# Patient Record
Sex: FEMALE | Race: WHITE | NOT HISPANIC OR LATINO | Employment: OTHER | URBAN - METROPOLITAN AREA
[De-identification: names, ages, dates, MRNs, and addresses within clinical notes are randomized per-mention and may not be internally consistent; named-entity substitution may affect disease eponyms.]

---

## 2018-09-18 ENCOUNTER — HOSPITAL ENCOUNTER (EMERGENCY)
Facility: HOSPITAL | Age: 73
Discharge: HOME | End: 2018-09-18
Attending: INTERNAL MEDICINE
Payer: MEDICARE

## 2018-09-18 ENCOUNTER — APPOINTMENT (EMERGENCY)
Dept: RADIOLOGY | Facility: HOSPITAL | Age: 73
End: 2018-09-18
Payer: MEDICARE

## 2018-09-18 ENCOUNTER — OFFICE VISIT (OUTPATIENT)
Dept: URGENT CARE | Facility: CLINIC | Age: 73
End: 2018-09-18
Payer: MEDICARE

## 2018-09-18 VITALS
RESPIRATION RATE: 16 BRPM | TEMPERATURE: 98.9 F | OXYGEN SATURATION: 97 % | SYSTOLIC BLOOD PRESSURE: 152 MMHG | HEART RATE: 84 BPM | DIASTOLIC BLOOD PRESSURE: 84 MMHG

## 2018-09-18 VITALS
WEIGHT: 160 LBS | SYSTOLIC BLOOD PRESSURE: 173 MMHG | HEART RATE: 71 BPM | HEIGHT: 64 IN | BODY MASS INDEX: 27.31 KG/M2 | TEMPERATURE: 97.6 F | OXYGEN SATURATION: 99 % | RESPIRATION RATE: 16 BRPM | DIASTOLIC BLOOD PRESSURE: 77 MMHG

## 2018-09-18 DIAGNOSIS — I10 ACCELERATED HYPERTENSION: Primary | ICD-10-CM

## 2018-09-18 DIAGNOSIS — R07.9 CHEST PAIN, UNSPECIFIED TYPE: Primary | ICD-10-CM

## 2018-09-18 LAB
ALBUMIN SERPL BCP-MCNC: 4.5 G/DL (ref 3.5–5.7)
ALP SERPL-CCNC: 64 U/L (ref 55–165)
ALT SERPL W P-5'-P-CCNC: 14 U/L (ref 7–52)
ANION GAP SERPL CALCULATED.3IONS-SCNC: 9 MMOL/L (ref 4–13)
APTT PPP: 32 SECONDS (ref 24–36)
AST SERPL W P-5'-P-CCNC: 21 U/L (ref 13–39)
BASOPHILS # BLD AUTO: 0 THOUSANDS/ΜL (ref 0–0.1)
BASOPHILS NFR BLD AUTO: 1 % (ref 0–2)
BILIRUB SERPL-MCNC: 0.5 MG/DL (ref 0.2–1)
BUN SERPL-MCNC: 20 MG/DL (ref 7–25)
CALCIUM SERPL-MCNC: 9.7 MG/DL (ref 8.6–10.5)
CHLORIDE SERPL-SCNC: 98 MMOL/L (ref 98–107)
CO2 SERPL-SCNC: 30 MMOL/L (ref 21–31)
CREAT SERPL-MCNC: 1.1 MG/DL (ref 0.6–1.2)
EOSINOPHIL # BLD AUTO: 0.2 THOUSAND/ΜL (ref 0–0.61)
EOSINOPHIL NFR BLD AUTO: 3 % (ref 0–5)
ERYTHROCYTE [DISTWIDTH] IN BLOOD BY AUTOMATED COUNT: 13.3 % (ref 11.5–14.5)
GFR SERPL CREATININE-BSD FRML MDRD: 50 ML/MIN/1.73SQ M
GLUCOSE SERPL-MCNC: 98 MG/DL (ref 65–99)
HCT VFR BLD AUTO: 38.4 % (ref 34.8–46.1)
HGB BLD-MCNC: 13.6 G/DL (ref 12–16)
HOLD SPECIMEN: NORMAL
INR PPP: 1.19 (ref 0.9–1.5)
LYMPHOCYTES # BLD AUTO: 1.9 THOUSANDS/ΜL (ref 0.6–4.47)
LYMPHOCYTES NFR BLD AUTO: 31 % (ref 21–51)
MCH RBC QN AUTO: 30.5 PG (ref 26–34)
MCHC RBC AUTO-ENTMCNC: 35.3 G/DL (ref 31–37)
MCV RBC AUTO: 86 FL (ref 81–99)
MONOCYTES # BLD AUTO: 0.5 THOUSAND/ΜL (ref 0.17–1.22)
MONOCYTES NFR BLD AUTO: 8 % (ref 2–12)
NEUTROPHILS # BLD AUTO: 3.5 THOUSANDS/ΜL (ref 1.4–6.5)
NEUTS SEG NFR BLD AUTO: 57 % (ref 42–75)
NRBC BLD AUTO-RTO: 0 /100 WBCS
PLATELET # BLD AUTO: 254 THOUSANDS/UL (ref 149–390)
PMV BLD AUTO: 8.1 FL (ref 8.6–11.7)
POTASSIUM SERPL-SCNC: 3.8 MMOL/L (ref 3.5–5.5)
PROT SERPL-MCNC: 7.4 G/DL (ref 6.4–8.9)
PROTHROMBIN TIME: 13.9 SECONDS (ref 10.1–12.9)
RBC # BLD AUTO: 4.44 MILLION/UL (ref 3.9–5.2)
SODIUM SERPL-SCNC: 137 MMOL/L (ref 134–143)
TROPONIN I SERPL-MCNC: <0.03 NG/ML
WBC # BLD AUTO: 6.1 THOUSAND/UL (ref 4.8–10.8)

## 2018-09-18 PROCEDURE — 99285 EMERGENCY DEPT VISIT HI MDM: CPT

## 2018-09-18 PROCEDURE — 99203 OFFICE O/P NEW LOW 30 MIN: CPT | Performed by: PHYSICIAN ASSISTANT

## 2018-09-18 PROCEDURE — 93005 ELECTROCARDIOGRAM TRACING: CPT | Performed by: PHYSICIAN ASSISTANT

## 2018-09-18 PROCEDURE — 85610 PROTHROMBIN TIME: CPT | Performed by: INTERNAL MEDICINE

## 2018-09-18 PROCEDURE — 85025 COMPLETE CBC W/AUTO DIFF WBC: CPT | Performed by: INTERNAL MEDICINE

## 2018-09-18 PROCEDURE — 36415 COLL VENOUS BLD VENIPUNCTURE: CPT | Performed by: INTERNAL MEDICINE

## 2018-09-18 PROCEDURE — 80053 COMPREHEN METABOLIC PANEL: CPT | Performed by: INTERNAL MEDICINE

## 2018-09-18 PROCEDURE — G0463 HOSPITAL OUTPT CLINIC VISIT: HCPCS | Performed by: PHYSICIAN ASSISTANT

## 2018-09-18 PROCEDURE — 85730 THROMBOPLASTIN TIME PARTIAL: CPT | Performed by: INTERNAL MEDICINE

## 2018-09-18 PROCEDURE — 84484 ASSAY OF TROPONIN QUANT: CPT | Performed by: INTERNAL MEDICINE

## 2018-09-18 PROCEDURE — 71046 X-RAY EXAM CHEST 2 VIEWS: CPT

## 2018-09-18 RX ORDER — NITROGLYCERIN 0.4 MG/1
0.4 TABLET SUBLINGUAL ONCE
Status: COMPLETED | OUTPATIENT
Start: 2018-09-18 | End: 2018-09-18

## 2018-09-18 RX ORDER — LISINOPRIL 10 MG/1
10 TABLET ORAL ONCE
Status: COMPLETED | OUTPATIENT
Start: 2018-09-18 | End: 2018-09-18

## 2018-09-18 RX ORDER — LISINOPRIL 10 MG/1
10 TABLET ORAL DAILY
Qty: 20 TABLET | Refills: 0 | Status: SHIPPED | OUTPATIENT
Start: 2018-09-18 | End: 2019-08-02 | Stop reason: SDUPTHER

## 2018-09-18 RX ORDER — HYDROCHLOROTHIAZIDE 25 MG/1
25 TABLET ORAL 2 TIMES DAILY
COMMUNITY
End: 2021-02-02 | Stop reason: HOSPADM

## 2018-09-18 RX ORDER — LISINOPRIL 5 MG/1
2 TABLET ORAL 2 TIMES DAILY
COMMUNITY
End: 2018-09-18

## 2018-09-18 RX ORDER — LISINOPRIL 10 MG/1
10 TABLET ORAL DAILY
Qty: 20 TABLET | Refills: 0 | Status: ON HOLD | OUTPATIENT
Start: 2018-09-18 | End: 2021-02-02 | Stop reason: SDUPTHER

## 2018-09-18 RX ORDER — GLIPIZIDE 5 MG/1
2.5 TABLET ORAL DAILY
COMMUNITY
End: 2021-02-02 | Stop reason: HOSPADM

## 2018-09-18 RX ORDER — DIPHENOXYLATE HYDROCHLORIDE AND ATROPINE SULFATE 2.5; .025 MG/1; MG/1
1 TABLET ORAL DAILY
COMMUNITY

## 2018-09-18 RX ORDER — ASPIRIN 81 MG/1
81 TABLET, CHEWABLE ORAL DAILY
COMMUNITY

## 2018-09-18 RX ADMIN — LISINOPRIL 10 MG: 10 TABLET ORAL at 21:33

## 2018-09-18 RX ADMIN — NITROGLYCERIN 0.4 MG: 0.4 TABLET SUBLINGUAL at 20:28

## 2018-09-18 NOTE — ED PROVIDER NOTES
History  Chief Complaint   Patient presents with    Chest Pain     This is a 23-year-old white female has been suffering from chest discomfort and pressure for the last 3 or 4 days  She describes pain in her legs as well from time to time  This is been keeping her awake  She notes increasing pain in her legs when she walks, however the chest pressure really does not change  She has had no radiation to the jaw no diaphoresis and nothing down left arm  Her bowels and bladder have been normal  She is hypertensive and diabetic  She has no history of cardiac disease  Prior to Admission Medications   Prescriptions Last Dose Informant Patient Reported? Taking?   aspirin 81 mg chewable tablet   Yes Yes   Sig: Chew 81 mg daily   glipiZIDE (GLUCOTROL) 5 mg tablet   Yes No   Sig: Take 2 5 mg by mouth daily     hydrochlorothiazide (HYDRODIURIL) 25 mg tablet   Yes No   Sig: Take 25 mg by mouth 2 (two) times a day     lisinopril (ZESTRIL) 5 mg tablet   Yes No   Sig: Take 2 mg by mouth 2 (two) times a day     multivitamin (THERAGRAN) TABS   Yes Yes   Sig: Take 1 tablet by mouth daily      Facility-Administered Medications: None       Past Medical History:   Diagnosis Date    Diabetes mellitus ([facility] [address] )     Hypertension        Past Surgical History:   Procedure Laterality Date    HYSTERECTOMY         History reviewed  No pertinent family history  I have reviewed and agree with the history as documented  Social History   Substance Use Topics    Smoking status: Never Smoker    Smokeless tobacco: Never Used    Alcohol use No        Review of Systems   Constitutional: Negative for chills and fever  HENT: Negative for rhinorrhea and sore throat  Eyes: Negative for visual disturbance  Respiratory: Positive for shortness of breath  Negative for cough  Cardiovascular: Positive for chest pain  Negative for leg swelling  Gastrointestinal: Negative for abdominal pain, diarrhea, nausea and vomiting  Genitourinary: Negative for dysuria  Musculoskeletal: Positive for myalgias  Negative for back pain  Skin: Negative for rash  Neurological: Negative for dizziness and headaches  Psychiatric/Behavioral: Negative for confusion  All other systems reviewed and are negative  Physical Exam  Physical Exam   Constitutional: She is oriented to person, place, and time  She appears well-developed and well-nourished  HENT:   Nose: Nose normal    Mouth/Throat: Oropharynx is clear and moist  No oropharyngeal exudate  Eyes: Conjunctivae and EOM are normal  Pupils are equal, round, and reactive to light  No scleral icterus  Neck: Normal range of motion  Neck supple  No JVD present  No tracheal deviation present  Cardiovascular: Normal rate and regular rhythm  Exam reveals gallop  No murmur heard  Pulmonary/Chest: Effort normal and breath sounds normal  No respiratory distress  She has no wheezes  She has no rales  Abdominal: Soft  Bowel sounds are normal  There is no tenderness  There is no guarding  Musculoskeletal: Normal range of motion  She exhibits no edema or tenderness  Neurological: She is alert and oriented to person, place, and time  No cranial nerve deficit or sensory deficit  She exhibits normal muscle tone  5/5 motor, nl sens   Skin: Skin is warm and dry  Psychiatric: She has a normal mood and affect  Her behavior is normal    Nursing note and vitals reviewed        Vital Signs  ED Triage Vitals [09/18/18 1903]   Temperature Pulse Respirations Blood Pressure SpO2   97 6 °F (36 4 °C) 82 16 (!) 212/96 97 %      Temp src Heart Rate Source Patient Position - Orthostatic VS BP Location FiO2 (%)   -- -- -- -- --      Pain Score       8           Vitals:    09/18/18 1903   BP: (!) 212/96   Pulse: 82       Visual Acuity      ED Medications  Medications - No data to display    Diagnostic Studies  Results Reviewed     Procedure Component Value Units Date/Time    Comprehensive metabolic panel [[id number]]     Lab Status:  No result Specimen:  Blood     CBC and differential [[id number]]     Lab Status:  No result Specimen:  Blood     Troponin I [[id number]]     Lab Status:  No result Specimen:  Blood     APTT [[id number]]     Lab Status:  No result Specimen:  Blood     Protime-INR [[id number]]     Lab Status:  No result Specimen:  Blood                  X-ray chest 2 views    (Results Pending)              Procedures  ECG 12 Lead Documentation  Date/Time: 9/18/2018 9:17 PM  Performed by: Gisella Solares  Authorized by: Gisella Solares     Indications / Diagnosis:  Chest pressure  ECG reviewed by me, the ED Provider: yes    Patient location:  ED  Previous ECG:     Previous ECG:  Unavailable    Comparison to cardiac monitor: Yes    Interpretation:     Interpretation: normal    Rate:     ECG rate:  78    ECG rate assessment: normal    Rhythm:     Rhythm: sinus rhythm    QRS:     QRS axis:  Normal    QRS intervals:  Normal  Conduction:     Conduction: normal    ST segments:     ST segments:  Normal  T waves:     T waves: normal             Phone Contacts  ED Phone Contact    ED Course  ED Course as of Sep 19 0108   Tue Sep 18, 2018   2118 Symptoms improved after 1 nitroglycerin and decrease in her blood pressure  Will have her follow up with her family doctor monitor outpatient blood pressures  MDM  CritCare Time    Disposition  Final diagnoses:   None     ED Disposition     None      Follow-up Information    None         Patient's Medications   Discharge Prescriptions    No medications on file     No discharge procedures on file      ED Provider  Electronically Signed by           Dwight Castaneda DO  09/18/18 2118       Dwight Castaneda DO  09/19/18 6337

## 2018-09-18 NOTE — PROGRESS NOTES
[facility]    NAME: Jocelin Phan is a 67 y o  female  : 1945    MRN: [id number]  DATE: 2018  TIME: 6:46 PM    Assessment and Plan   Chest pain, unspecified type [R07 9]  1  Chest pain, unspecified type         Patient Instructions     Patient Instructions   Recommend the patient go to the emergency room for further evaluation and workup  She agrees and will be going to [facility]   Patient's daughter is here with her and will drive her  Chief Complaint     Chief Complaint   Patient presents with    Generalized Body Aches     Pt c/o body aches and chest tightness for a week  History of Present Illness   65-year-old diabetic female here with complaint of chest heaviness that radiates to her right shoulder area, body aches and leg cramps for the last couple weeks  Symptoms worsened 2 days ago  Patient feels very weak and just does not feel well  States that she is hurting  Denies shortness of breath  Denies any cold symptoms  No fever or chills  Has felt nauseous today  No vomiting  No abdominal pain, diarrhea or urinary complaints  Review of Systems   Review of Systems   Constitutional: Negative for activity change, appetite change, chills, diaphoresis, fatigue, fever and unexpected weight change  HENT: Negative for congestion, dental problem, hearing loss, sinus pressure, sneezing, sore throat, tinnitus, trouble swallowing and voice change  Eyes: Negative for photophobia, redness and visual disturbance  Respiratory: Positive for chest tightness  Negative for apnea, cough, shortness of breath, wheezing and stridor  Cardiovascular: Positive for chest pain  Negative for palpitations and leg swelling  Gastrointestinal: Positive for nausea  Negative for abdominal distention, abdominal pain, blood in stool, constipation, diarrhea and vomiting  Endocrine: Negative for cold intolerance, heat intolerance, polydipsia, polyphagia and polyuria  Genitourinary: Negative for difficulty urinating, dysuria, flank pain, frequency, hematuria and urgency  Musculoskeletal: Positive for myalgias  Negative for arthralgias, back pain, gait problem, joint swelling, neck pain and neck stiffness  Skin: Negative for pallor, rash and wound  Neurological: Negative for dizziness, tremors, seizures, speech difficulty, weakness and headaches  Hematological: Negative for adenopathy  Does not bruise/bleed easily  Psychiatric/Behavioral: Negative for agitation, confusion, dysphoric mood and sleep disturbance  The patient is not nervous/anxious  All other systems reviewed and are negative  Current Medications     Current Outpatient Prescriptions:     glipiZIDE (GLUCOTROL) 5 mg tablet, Take 5 mg by mouth 2 (two) times a day before meals, Disp: , Rfl:     hydrochlorothiazide (HYDRODIURIL) 25 mg tablet, Take 25 mg by mouth daily, Disp: , Rfl:     lisinopril (ZESTRIL) 5 mg tablet, Take 5 mg by mouth daily, Disp: , Rfl:     Current Allergies     Allergies as of 09/18/2018    (No Known Allergies)          The following portions of the patient's history were reviewed and updated as appropriate: allergies, current medications, past family history, past medical history, past social history, past surgical history and problem list    No past medical history on file  No past surgical history on file  No family history on file  Social History     Social History    Marital status:      Spouse name: N/A    Number of children: N/A    Years of education: N/A     Occupational History    Not on file  Social History Main Topics    Smoking status: Not on file    Smokeless tobacco: Not on file    Alcohol use Not on file    Drug use: Unknown    Sexual activity: Not on file     Other Topics Concern    Not on file     Social History Narrative    No narrative on file     Medications have been verified      Objective   /84   Pulse 84   Temp 98 9 °F (37 2 °C)   Resp 16   SpO2 97%      Physical Exam   Physical Exam   Constitutional: She appears well-developed and well-nourished  No distress  HENT:   Head: Normocephalic  Right Ear: External ear normal    Left Ear: External ear normal    Nose: Nose normal    Mouth/Throat: Oropharynx is clear and moist  No oropharyngeal exudate  Neck: Normal range of motion  Neck supple  Cardiovascular: Normal rate, regular rhythm, normal heart sounds, intact distal pulses and normal pulses  No murmur heard  Pulmonary/Chest: Effort normal and breath sounds normal  No respiratory distress  She has no wheezes  She has no rales  Abdominal: Soft  Bowel sounds are normal  There is no tenderness  Musculoskeletal: Normal range of motion  Lymphadenopathy:     She has no cervical adenopathy  Skin: Skin is warm  No rash noted  Nursing note reviewed

## 2018-09-18 NOTE — PATIENT INSTRUCTIONS
Recommend the patient go to the emergency room for further evaluation and workup  She agrees and will be going to [facility]   Patient's daughter is here with her and will drive her

## 2018-09-19 LAB
ATRIAL RATE: 78 BPM
P AXIS: 51 DEGREES
PR INTERVAL: 144 MS
QRS AXIS: 32 DEGREES
QRSD INTERVAL: 92 MS
QT INTERVAL: 384 MS
QTC INTERVAL: 437 MS
T WAVE AXIS: 69 DEGREES
VENTRICULAR RATE: 78 BPM

## 2018-09-19 PROCEDURE — 93010 ELECTROCARDIOGRAM REPORT: CPT | Performed by: INTERNAL MEDICINE

## 2018-09-19 NOTE — DISCHARGE INSTRUCTIONS
Chronic Hypertension, Ambulatory Care   GENERAL INFORMATION:   Chronic hypertension  is a long-term condition in which your blood pressure (BP) is higher than normal  Your BP is the force of your blood moving against the walls of your arteries  Hypertension is a BP of 140/90 or higher  Common symptoms include the following:   · Headache     · Blurred vision    · Chest pain     · Dizziness or weakness     · Trouble breathing     · Nosebleeds  Seek immediate care for the following symptoms:   · Severe headache or vision loss    · Weakness in an arm or leg    · Confusion or difficulty speaking    · Discomfort in your chest that feels like squeezing, pressure, fullness, or pain    · Suddenly feeling lightheaded or trouble breathing    · Pain or discomfort in your back, neck, jaw, stomach, or arm  Treatment for chronic hypertension  may include medicine to lower your BP  You may also need to make lifestyle changes  Take your medicine exactly as directed  Manage chronic hypertension:   · Take your BP at home  Sit and rest for 5 minutes before you take your BP  Extend your arm and support it on a flat surface  Your arm should be at the same level as your heart  Follow the directions that came with your BP monitor  If possible, take at least 2 BP readings each time  Take your BP at least twice a day at the same times each day, such as morning and evening  Keep a log of your BP readings and bring it to your follow-up visits  · Eat less sodium (salt)  Do not add sodium to your food  Limit foods that are high in sodium, such as canned foods, potato chips, and cold cuts  Your healthcare provider may suggest that you follow the [address] [place] [address] Street  The plan is low in sodium, unhealthy fats, and total fat  It is high in potassium, calcium, and fiber  · Exercise regularly  Exercise at least 30 minutes per day, on most days of the week  This will help decrease your BP   Ask your healthcare provider about the best exercise plan for you  · Limit alcohol  Women should limit alcohol to 1 drink a day  Men should limit alcohol to 2 drinks a day  A drink of alcohol is 12 ounces of beer, 5 ounces of wine, or 1½ ounces of liquor  · Do not smoke  If you smoke, it is never too late to quit  Smoking can increase your BP  Smoking also worsens other health conditions you may have that can increase your risk for hypertension  Ask your healthcare provider for information if you need help quitting  Follow up with your healthcare provider as directed: You will need to return to have your BP checked and to have other lab tests done  Write down your questions so you remember to ask them during your visits  CARE AGREEMENT:   You have the right to help plan your care  Learn about your health condition and how it may be treated  Discuss treatment options with your caregivers to decide what care you want to receive  You always have the right to refuse treatment  The above information is an  only  It is not intended as medical advice for individual conditions or treatments  Talk to your doctor, nurse or pharmacist before following any medical regimen to see if it is safe and effective for you  © 2014 [address] is for End User's use only and may not be sold, redistributed or otherwise used for commercial purposes  All illustrations and images included in CareNotes® are the copyrighted property of A D A M [employer] Inc  or David Pérez

## 2018-10-01 ENCOUNTER — TRANSCRIBE ORDERS (OUTPATIENT)
Dept: ADMINISTRATIVE | Facility: HOSPITAL | Age: 73
End: 2018-10-01

## 2018-10-01 DIAGNOSIS — R07.9 CHEST PAIN, UNSPECIFIED TYPE: Primary | ICD-10-CM

## 2018-11-05 ENCOUNTER — HOSPITAL ENCOUNTER (OUTPATIENT)
Dept: NON INVASIVE DIAGNOSTICS | Facility: CLINIC | Age: 73
Discharge: HOME | End: 2018-11-05
Payer: MEDICARE

## 2018-11-05 DIAGNOSIS — R07.9 CHEST PAIN, UNSPECIFIED TYPE: ICD-10-CM

## 2018-11-05 LAB
CHEST PAIN STATEMENT: NORMAL
MAX DIASTOLIC BP: 80 MMHG
MAX HEART RATE: 187 BPM
MAX PREDICTED HEART RATE: 147 BPM
MAX. SYSTOLIC BP: 200 MMHG
PROTOCOL NAME: NORMAL
REASON FOR TERMINATION: NORMAL
TARGET HR FORMULA: NORMAL
TEST INDICATION: NORMAL
TIME IN EXERCISE PHASE: NORMAL

## 2018-11-05 PROCEDURE — 93016 CV STRESS TEST SUPVJ ONLY: CPT | Performed by: INTERNAL MEDICINE

## 2018-11-05 PROCEDURE — 93018 CV STRESS TEST I&R ONLY: CPT | Performed by: INTERNAL MEDICINE

## 2018-11-05 PROCEDURE — 93017 CV STRESS TEST TRACING ONLY: CPT

## 2019-07-02 ENCOUNTER — HOSPITAL ENCOUNTER (OUTPATIENT)
Dept: RADIOLOGY | Facility: HOSPITAL | Age: 74
Discharge: HOME | End: 2019-07-02
Payer: MEDICARE

## 2019-07-02 ENCOUNTER — TRANSCRIBE ORDERS (OUTPATIENT)
Dept: ADMINISTRATIVE | Facility: HOSPITAL | Age: 74
End: 2019-07-02

## 2019-07-02 DIAGNOSIS — M54.2 CERVICALGIA: ICD-10-CM

## 2019-07-02 DIAGNOSIS — M54.5 LOW BACK PAIN, UNSPECIFIED BACK PAIN LATERALITY, UNSPECIFIED CHRONICITY, WITH SCIATICA PRESENCE UNSPECIFIED: Primary | ICD-10-CM

## 2019-07-02 DIAGNOSIS — M54.5 LOW BACK PAIN, UNSPECIFIED BACK PAIN LATERALITY, UNSPECIFIED CHRONICITY, WITH SCIATICA PRESENCE UNSPECIFIED: ICD-10-CM

## 2019-07-02 PROCEDURE — 72100 X-RAY EXAM L-S SPINE 2/3 VWS: CPT

## 2019-07-02 PROCEDURE — 72040 X-RAY EXAM NECK SPINE 2-3 VW: CPT

## 2019-07-08 ENCOUNTER — TRANSCRIBE ORDERS (OUTPATIENT)
Dept: ADMINISTRATIVE | Facility: HOSPITAL | Age: 74
End: 2019-07-08

## 2019-07-08 DIAGNOSIS — M54.5 LOW BACK PAIN, UNSPECIFIED BACK PAIN LATERALITY, UNSPECIFIED CHRONICITY, WITH SCIATICA PRESENCE UNSPECIFIED: Primary | ICD-10-CM

## 2019-07-15 ENCOUNTER — HOSPITAL ENCOUNTER (OUTPATIENT)
Dept: MRI IMAGING | Facility: HOSPITAL | Age: 74
Discharge: HOME | End: 2019-07-15
Payer: MEDICARE

## 2019-07-15 DIAGNOSIS — M54.5 LOW BACK PAIN, UNSPECIFIED BACK PAIN LATERALITY, UNSPECIFIED CHRONICITY, WITH SCIATICA PRESENCE UNSPECIFIED: ICD-10-CM

## 2019-07-15 PROCEDURE — 72148 MRI LUMBAR SPINE W/O DYE: CPT

## 2019-08-02 ENCOUNTER — OFFICE VISIT (OUTPATIENT)
Dept: URGENT CARE | Facility: CLINIC | Age: 74
End: 2019-08-02
Payer: MEDICARE

## 2019-08-02 VITALS
HEART RATE: 71 BPM | RESPIRATION RATE: 16 BRPM | WEIGHT: 160 LBS | OXYGEN SATURATION: 97 % | BODY MASS INDEX: 27.31 KG/M2 | HEIGHT: 64 IN | DIASTOLIC BLOOD PRESSURE: 82 MMHG | SYSTOLIC BLOOD PRESSURE: 176 MMHG | TEMPERATURE: 97.7 F

## 2019-08-02 DIAGNOSIS — B37.2 YEAST DERMATITIS: Primary | ICD-10-CM

## 2019-08-02 DIAGNOSIS — L25.9 CONTACT DERMATITIS, UNSPECIFIED CONTACT DERMATITIS TYPE, UNSPECIFIED TRIGGER: ICD-10-CM

## 2019-08-02 PROCEDURE — G0463 HOSPITAL OUTPT CLINIC VISIT: HCPCS | Performed by: PHYSICIAN ASSISTANT

## 2019-08-02 PROCEDURE — 99213 OFFICE O/P EST LOW 20 MIN: CPT | Performed by: PHYSICIAN ASSISTANT

## 2019-08-02 RX ORDER — CLOTRIMAZOLE AND BETAMETHASONE DIPROPIONATE 10; .64 MG/G; MG/G
CREAM TOPICAL 2 TIMES DAILY
Qty: 45 G | Refills: 1 | Status: SHIPPED | OUTPATIENT
Start: 2019-08-02 | End: 2021-02-02 | Stop reason: HOSPADM

## 2019-08-02 RX ORDER — MELOXICAM 15 MG/1
TABLET ORAL
COMMUNITY
End: 2021-02-02 | Stop reason: HOSPADM

## 2019-08-02 RX ORDER — GABAPENTIN 100 MG/1
CAPSULE ORAL
COMMUNITY

## 2019-08-02 NOTE — PROGRESS NOTES
[facility]        NAME: Brayden Bruno is a 75 y o  female  : 1945    MRN: [id number]  DATE: 2019  TIME: 1:35 PM    Assessment and Plan   Yeast dermatitis [B37 2]  1  Yeast dermatitis  clotrimazole-betamethasone (LOTRISONE) 1-0 05 % cream   2  Contact dermatitis, unspecified contact dermatitis type, unspecified trigger  fluocinonide (LIDEX) 0 05 % cream         Patient Instructions       Follow up with PCP in 3-5 days  Proceed to  ER if symptoms worsen  Chief Complaint     Chief Complaint   Patient presents with    Rash     Pt c/o a rash for four weeks  History of Present Illness       Patient presents with a pruritic rash for the past 3 weeks  She was treated for 1 week with the tapering steroids which improved this symptoms but now they have returned  She now has a different type of rash beneath both breasts  She states the rashes are very pruritic  She denies any drainage  Review of Systems   Review of Systems   Constitutional: Negative for fever  Respiratory: Negative for cough  Gastrointestinal: Negative for nausea and vomiting  Musculoskeletal: Negative for myalgias  Skin: Positive for rash  Neurological: Negative for weakness and numbness  Hematological: Negative for adenopathy           Current Medications       Current Outpatient Medications:     aspirin 81 mg chewable tablet, Chew 81 mg daily, Disp: , Rfl:     clotrimazole-betamethasone (LOTRISONE) 1-0 05 % cream, Apply topically 2 (two) times a day Apply beneath breasts, Disp: 45 g, Rfl: 1    fluocinonide (LIDEX) 0 05 % cream, Apply topically 2 (two) times a day To treat poison, Disp: 45 g, Rfl: 1    gabapentin (NEURONTIN) 100 mg capsule, gabapentin 100 mg capsule, Disp: , Rfl:     glipiZIDE (GLUCOTROL) 5 mg tablet, Take 2 5 mg by mouth daily  , Disp: , Rfl:     hydrochlorothiazide (HYDRODIURIL) 25 mg tablet, Take 25 mg by mouth 2 (two) times a day  , Disp: , Rfl:     lisinopril (ZESTRIL) 10 mg tablet, Take 1 tablet (10 mg total) by mouth daily, Disp: 20 tablet, Rfl: 0    meloxicam (MOBIC) 15 mg tablet, take 1 tablet by mouth once daily with meals, Disp: , Rfl:     multivitamin (THERAGRAN) TABS, Take 1 tablet by mouth daily, Disp: , Rfl:     Current Allergies     Allergies as of 08/02/2019    (No Known Allergies)            The following portions of the patient's history were reviewed and updated as appropriate: allergies, current medications, past family history, past medical history, past social history, past surgical history and problem list      Past Medical History:   Diagnosis Date    Arthritis     Diabetes mellitus ([facility] [address] )     Hypertension        Past Surgical History:   Procedure Laterality Date    HYSTERECTOMY         History reviewed  No pertinent family history  Medications have been verified  Objective   BP (!) 176/82   Pulse 71   Temp 97 7 °F (36 5 °C)   Resp 16   Ht 5' 4" (1 626 m)   Wt 72 6 kg (160 lb)   SpO2 97%   BMI 27 46 kg/m²        Physical Exam     Physical Exam   Constitutional: She is oriented to person, place, and time  She appears well-developed and well-nourished  HENT:   Head: Normocephalic and atraumatic  Neck: Normal range of motion  Cardiovascular: Normal rate and regular rhythm  Pulmonary/Chest: Effort normal    Neurological: She is alert and oriented to person, place, and time  Skin: Skin is warm and dry  Rash (Maculopapular skin rash on patient's right middle finger right forearm and an erythematous rash beneath both breasts occasional satellite lesion consistent with a yeast dermatitis ) noted  Psychiatric: She has a normal mood and affect  Her behavior is normal    Nursing note and vitals reviewed

## 2019-08-02 NOTE — PATIENT INSTRUCTIONS
Skin Yeast Infection   AMBULATORY CARE:   What do I need to know about a skin yeast infection? Yeast is normally present on the skin  Infection happens when you have too much yeast, or when it gets into a cut on your skin  Certain types of mold and fungus can cause a yeast infection  A skin yeast infection can appear anywhere on your skin or nail beds  Skin yeast infections are usually found on warm, moist parts of the body  Examples include between skin folds or under the breasts  Common symptoms include the following:  Signs and symptoms will depend on the type of yeast causing the infection, and where the infection is located  · Red, scaly skin    · Changes in skin color, especially a beefy red color    · Itching, dry skin    · Painful, cracking skin at the corners of your mouth    · Thick, discolored, chipping nails    · Skin lesions that may be red or purple and round    · Pus bumps  Seek care immediately if:   · You have signs of infection, such as pus, warmth or red streaks coming from the wound, or a fever  Contact your healthcare provider if:   · Your symptoms worsen or do not get better within 7 to 10 days  · You have new or returning signs of a skin yeast infection after treatment  · You have questions or concerns about your condition or care  Treatment for a skin yeast infection  may include antifungal medicine to treat the fungal infection  The medicine be given as a cream, ointment, or pill  Care for the skin near the infection:  You may only have discolored patches of skin, or areas that are dry and flaking  Care for these skin problems as directed by your healthcare provider  If you have painful skin or an open sore, you will need to protect the skin and prevent damage  You will also need to keep the skin dry as much as possible  Ask your healthcare provider how to care for your skin while the infection clears   The following are general guidelines for caring for painful or open skin:  · Keep the skin clean  Ask your healthcare provider if you should wash with mild soap and water  Do not use soap that contains alcohol  Alcohol can dry and irritate the skin and make symptoms worse  Your baby's healthcare provider may tell you to use diaper cream or ointment when you change his diaper  This will protect the skin and prevent moisture from collecting  · Keep the skin dry  Pat the area dry with a towel  Do not rub, because this may irritate the skin  If you have a skin yeast infection between skin folds, lift the top part gently and hold it while you dry between your skin folds  Always dry your feet completely after you swim or bathe, including between your toes  Dry your skin if you are sweating from exercise or exposure to heat  Use a clean towel each time to prevent spreading or continuing the infection  · Keep the skin protected  Ask your healthcare provider if you should cover the area with a bandage or leave it open  Check your skin each day to make sure you do not have new or worsening problems  You may need to have someone check the skin if you cannot see the area easily  Prevent another skin yeast infection:   · Do not share clothing or towels    · Wear shower shoes if you need to use a public shower    · Dry your feet completely after you bathe, and apply antifungal powder or cream as directed    · Put on socks before you get dressed so you do not spread fungus from your feet    · Wear light clothing that allows air to get to your skin    · Manage your weight to prevent skin folds where yeast can collect    · Manage diabetes    · Change your baby's diaper often, and keep the area clean and dry as much as possible    · Use a diaper cream or ointment that contains zinc oxide or dimethicone on your baby's diaper area as directed  Follow up with your healthcare provider as directed:  Write down your questions so you remember to ask them during your visits     © 2017 [facility] [address] [facility] is for End User's use only and may not be sold, redistributed or otherwise used for commercial purposes  All illustrations and images included in CareNotes® are the copyrighted property of A D A M , Inc  or David Pérez  The above information is an  only  It is not intended as medical advice for individual conditions or treatments  Talk to your doctor, nurse or pharmacist before following any medical regimen to see if it is safe and effective for you

## 2020-09-21 ENCOUNTER — TRANSCRIBE ORDERS (OUTPATIENT)
Dept: ADMINISTRATIVE | Facility: HOSPITAL | Age: 75
End: 2020-09-21

## 2020-09-21 DIAGNOSIS — I73.9 PERIPHERAL VASCULAR DISEASE, UNSPECIFIED (HCC): Primary | ICD-10-CM

## 2020-10-20 ENCOUNTER — HOSPITAL ENCOUNTER (OUTPATIENT)
Dept: NON INVASIVE DIAGNOSTICS | Facility: HOSPITAL | Age: 75
Discharge: HOME | End: 2020-10-20
Payer: MEDICARE

## 2020-10-20 DIAGNOSIS — I73.9 PERIPHERAL VASCULAR DISEASE, UNSPECIFIED (HCC): ICD-10-CM

## 2020-10-20 PROCEDURE — 93923 UPR/LXTR ART STDY 3+ LVLS: CPT

## 2020-10-20 PROCEDURE — 93923 UPR/LXTR ART STDY 3+ LVLS: CPT | Performed by: SURGERY

## 2021-01-29 ENCOUNTER — HOSPITAL ENCOUNTER (EMERGENCY)
Facility: HOSPITAL | Age: 76
Discharge: STILL ADMITTED OR ADMINISTRATIVE | End: 2021-01-29
Attending: INTERNAL MEDICINE
Payer: MEDICARE

## 2021-01-29 ENCOUNTER — APPOINTMENT (EMERGENCY)
Dept: RADIOLOGY | Facility: HOSPITAL | Age: 76
End: 2021-01-29
Payer: MEDICARE

## 2021-01-29 ENCOUNTER — HOSPITAL ENCOUNTER (INPATIENT)
Facility: HOSPITAL | Age: 76
LOS: 4 days | Discharge: HOME | DRG: 309 | End: 2021-02-02
Attending: STUDENT IN AN ORGANIZED HEALTH CARE EDUCATION/TRAINING PROGRAM | Admitting: INTERNAL MEDICINE
Payer: MEDICARE

## 2021-01-29 VITALS
DIASTOLIC BLOOD PRESSURE: 74 MMHG | OXYGEN SATURATION: 97 % | TEMPERATURE: 98.2 F | SYSTOLIC BLOOD PRESSURE: 169 MMHG | RESPIRATION RATE: 21 BRPM | HEART RATE: 84 BPM | BODY MASS INDEX: 27.66 KG/M2 | HEIGHT: 64 IN | WEIGHT: 162 LBS

## 2021-01-29 DIAGNOSIS — I49.1 PAC (PREMATURE ATRIAL CONTRACTION): ICD-10-CM

## 2021-01-29 DIAGNOSIS — I10 ACCELERATED HYPERTENSION: ICD-10-CM

## 2021-01-29 DIAGNOSIS — R07.9 CHEST PAIN: Primary | ICD-10-CM

## 2021-01-29 DIAGNOSIS — O16.1 ELEVATED BLOOD PRESSURE AFFECTING PREGNANCY IN FIRST TRIMESTER, ANTEPARTUM: ICD-10-CM

## 2021-01-29 PROBLEM — E13.9 NIDDY (NON-INSULIN DEPENDENT DIABETES MELLITUS IN YOUNG) (HCC): Status: ACTIVE | Noted: 2021-01-29

## 2021-01-29 PROBLEM — I25.10 CAD (CORONARY ARTERY DISEASE): Status: ACTIVE | Noted: 2021-01-29

## 2021-01-29 PROBLEM — E11.9 CONTROLLED TYPE 2 DIABETES MELLITUS, WITHOUT LONG-TERM CURRENT USE OF INSULIN (HCC): Status: ACTIVE | Noted: 2021-01-29

## 2021-01-29 PROBLEM — Z86.74 HISTORY OF CARDIAC ARREST: Status: ACTIVE | Noted: 2021-01-29

## 2021-01-29 LAB
ANION GAP SERPL CALCULATED.3IONS-SCNC: 11 MMOL/L (ref 4–13)
BASOPHILS # BLD AUTO: 0 THOUSANDS/ΜL (ref 0–0.1)
BASOPHILS NFR BLD AUTO: 1 % (ref 0–2)
BUN SERPL-MCNC: 24 MG/DL (ref 7–25)
CALCIUM SERPL-MCNC: 9.3 MG/DL (ref 8.6–10.5)
CHLORIDE SERPL-SCNC: 98 MMOL/L (ref 98–107)
CO2 SERPL-SCNC: 29 MMOL/L (ref 21–31)
CREAT SERPL-MCNC: 1.19 MG/DL (ref 0.6–1.2)
EOSINOPHIL # BLD AUTO: 0.1 THOUSAND/ΜL (ref 0–0.61)
EOSINOPHIL NFR BLD AUTO: 2 % (ref 0–5)
ERYTHROCYTE [DISTWIDTH] IN BLOOD BY AUTOMATED COUNT: 12.8 % (ref 11.5–14.5)
FLUAV RNA RESP QL NAA+PROBE: NEGATIVE
FLUBV RNA RESP QL NAA+PROBE: NEGATIVE
GFR SERPL CREATININE-BSD FRML MDRD: 44 ML/MIN/1.73SQ M
GLUCOSE SERPL-MCNC: 114 MG/DL (ref 65–99)
GLUCOSE SERPL-MCNC: 115 MG/DL (ref 65–140)
HCT VFR BLD AUTO: 40.2 % (ref 42–47)
HGB BLD-MCNC: 13.6 G/DL (ref 12–16)
LYMPHOCYTES # BLD AUTO: 1.8 THOUSANDS/ΜL (ref 0.6–4.47)
LYMPHOCYTES NFR BLD AUTO: 27 % (ref 21–51)
MCH RBC QN AUTO: 29.9 PG (ref 26–34)
MCHC RBC AUTO-ENTMCNC: 33.9 G/DL (ref 31–37)
MCV RBC AUTO: 88 FL (ref 81–99)
MONOCYTES # BLD AUTO: 0.6 THOUSAND/ΜL (ref 0.17–1.22)
MONOCYTES NFR BLD AUTO: 8 % (ref 2–12)
NEUTROPHILS # BLD AUTO: 4.2 THOUSANDS/ΜL (ref 1.4–6.5)
NEUTS SEG NFR BLD AUTO: 63 % (ref 42–75)
PLATELET # BLD AUTO: 242 THOUSANDS/UL (ref 149–390)
PMV BLD AUTO: 8.4 FL (ref 8.6–11.7)
POTASSIUM SERPL-SCNC: 3.5 MMOL/L (ref 3.5–5.5)
RBC # BLD AUTO: 4.55 MILLION/UL (ref 3.9–5.2)
RSV RNA RESP QL NAA+PROBE: NEGATIVE
SARS-COV-2 RNA RESP QL NAA+PROBE: NEGATIVE
SODIUM SERPL-SCNC: 138 MMOL/L (ref 134–143)
TROPONIN I SERPL-MCNC: <0.03 NG/ML
WBC # BLD AUTO: 6.7 THOUSAND/UL (ref 4.8–10.8)

## 2021-01-29 PROCEDURE — 82948 REAGENT STRIP/BLOOD GLUCOSE: CPT

## 2021-01-29 PROCEDURE — 84484 ASSAY OF TROPONIN QUANT: CPT | Performed by: PHYSICIAN ASSISTANT

## 2021-01-29 PROCEDURE — 0241U HB NFCT DS VIR RESP RNA 4 TRGT: CPT | Performed by: EMERGENCY MEDICINE

## 2021-01-29 PROCEDURE — 93005 ELECTROCARDIOGRAM TRACING: CPT

## 2021-01-29 PROCEDURE — 1124F ACP DISCUSS-NO DSCNMKR DOCD: CPT | Performed by: INTERNAL MEDICINE

## 2021-01-29 PROCEDURE — 71045 X-RAY EXAM CHEST 1 VIEW: CPT

## 2021-01-29 PROCEDURE — 84484 ASSAY OF TROPONIN QUANT: CPT | Performed by: EMERGENCY MEDICINE

## 2021-01-29 PROCEDURE — 99285 EMERGENCY DEPT VISIT HI MDM: CPT

## 2021-01-29 PROCEDURE — 80048 BASIC METABOLIC PNL TOTAL CA: CPT | Performed by: EMERGENCY MEDICINE

## 2021-01-29 PROCEDURE — 85025 COMPLETE CBC W/AUTO DIFF WBC: CPT | Performed by: EMERGENCY MEDICINE

## 2021-01-29 PROCEDURE — 99285 EMERGENCY DEPT VISIT HI MDM: CPT | Performed by: EMERGENCY MEDICINE

## 2021-01-29 PROCEDURE — 99223 1ST HOSP IP/OBS HIGH 75: CPT | Performed by: HOSPITALIST

## 2021-01-29 PROCEDURE — 36415 COLL VENOUS BLD VENIPUNCTURE: CPT | Performed by: EMERGENCY MEDICINE

## 2021-01-29 RX ORDER — LABETALOL 20 MG/4 ML (5 MG/ML) INTRAVENOUS SYRINGE
10 EVERY 4 HOURS PRN
Status: DISCONTINUED | OUTPATIENT
Start: 2021-01-29 | End: 2021-02-02 | Stop reason: HOSPADM

## 2021-01-29 RX ORDER — HYDROCHLOROTHIAZIDE 25 MG/1
25 TABLET ORAL 2 TIMES DAILY
Status: DISCONTINUED | OUTPATIENT
Start: 2021-01-30 | End: 2021-01-30

## 2021-01-29 RX ORDER — ASPIRIN 81 MG/1
243 TABLET ORAL ONCE
Status: COMPLETED | OUTPATIENT
Start: 2021-01-29 | End: 2021-01-29

## 2021-01-29 RX ORDER — ACETAMINOPHEN 325 MG/1
650 TABLET ORAL EVERY 6 HOURS PRN
Status: DISCONTINUED | OUTPATIENT
Start: 2021-01-29 | End: 2021-02-02 | Stop reason: HOSPADM

## 2021-01-29 RX ORDER — LISINOPRIL 5 MG/1
5 TABLET ORAL 2 TIMES DAILY
Status: DISCONTINUED | OUTPATIENT
Start: 2021-01-30 | End: 2021-01-31

## 2021-01-29 RX ORDER — ASPIRIN 81 MG/1
81 TABLET, CHEWABLE ORAL DAILY
Status: DISCONTINUED | OUTPATIENT
Start: 2021-01-30 | End: 2021-02-02 | Stop reason: HOSPADM

## 2021-01-29 RX ORDER — LISINOPRIL 20 MG/1
20 TABLET ORAL 2 TIMES DAILY
Status: DISCONTINUED | OUTPATIENT
Start: 2021-01-29 | End: 2021-01-29

## 2021-01-29 RX ORDER — SODIUM CHLORIDE 9 MG/ML
3 INJECTION INTRAVENOUS
Status: DISCONTINUED | OUTPATIENT
Start: 2021-01-29 | End: 2021-01-29 | Stop reason: HOSPADM

## 2021-01-29 RX ORDER — NITROGLYCERIN 0.4 MG/1
0.4 TABLET SUBLINGUAL ONCE
Status: COMPLETED | OUTPATIENT
Start: 2021-01-29 | End: 2021-01-29

## 2021-01-29 RX ORDER — ONDANSETRON 2 MG/ML
4 INJECTION INTRAMUSCULAR; INTRAVENOUS EVERY 6 HOURS PRN
Status: DISCONTINUED | OUTPATIENT
Start: 2021-01-29 | End: 2021-02-02 | Stop reason: HOSPADM

## 2021-01-29 RX ORDER — HEPARIN SODIUM 5000 [USP'U]/ML
5000 INJECTION, SOLUTION INTRAVENOUS; SUBCUTANEOUS EVERY 8 HOURS SCHEDULED
Status: DISCONTINUED | OUTPATIENT
Start: 2021-01-29 | End: 2021-02-02 | Stop reason: HOSPADM

## 2021-01-29 RX ORDER — GABAPENTIN 100 MG/1
100 CAPSULE ORAL DAILY
Status: DISCONTINUED | OUTPATIENT
Start: 2021-01-30 | End: 2021-02-02 | Stop reason: HOSPADM

## 2021-01-29 RX ADMIN — ASPIRIN 243 MG: 81 TABLET, COATED ORAL at 22:12

## 2021-01-29 RX ADMIN — HEPARIN SODIUM 5000 UNITS: 5000 INJECTION INTRAVENOUS; SUBCUTANEOUS at 22:56

## 2021-01-29 RX ADMIN — NITROGLYCERIN 0.4 MG: 0.4 TABLET SUBLINGUAL at 22:12

## 2021-01-29 NOTE — EMTALA/ACUTE CARE TRANSFER
[facility]  [address] [address]  [phone]  Dept: [phone]      EMTALA TRANSFER CONSENT    NAME Jael HAWKINS 1945                              MRN [id number]    I have been informed of my rights regarding examination, treatment, and transfer   by Dr Sheyla Colon MD    Benefits:      Risks:        Consent for Transfer:  I acknowledge that my medical condition has been evaluated and explained to me by the emergency department physician or other qualified medical person and/or my attending physician, who has recommended that I be transferred to the service of    at    The above potential benefits of such transfer, the potential risks associated with such transfer, and the probable risks of not being transferred have been explained to me, and I fully understand them  The doctor has explained that, in my case, the benefits of transfer outweigh the risks  I agree to be transferred  I authorize the performance of emergency medical procedures and treatments upon me in both transit and upon arrival at the receiving facility  Additionally, I authorize the release of any and all medical records to the receiving facility and request they be transported with me, if possible  I understand that the safest mode of transportation during a medical emergency is an ambulance and that the Hospital advocates the use of this mode of transport  Risks of traveling to the receiving facility by car, including absence of medical control, life sustaining equipment, such as oxygen, and medical personnel has been explained to me and I fully understand them  (BILLY CORRECT BOX BELOW)  [  ]  I consent to the stated transfer and to be transported by ambulance/helicopter  [  ]  I consent to the stated transfer, but refuse transportation by ambulance and accept full responsibility for my transportation by car    I understand the risks of non-ambulance transfers and I exonerate the Hospital and its staff from any deterioration in my condition that results from this refusal     X___________________________________________    DATE  21  TIME________  Signature of patient or legally responsible individual signing on patient behalf           RELATIONSHIP TO PATIENT_________________________          Provider Certification    NAME Ana Sherman                                         1945                              MRN [id number]    A medical screening exam was performed on the above named patient  Based on the examination:    Condition Necessitating Transfer The encounter diagnosis was Chest pain  Patient Condition:      Reason for Transfer:      Transfer Requirements: Facility     · Space available and qualified personnel available for treatment as acknowledged by    · Agreed to accept transfer and to provide appropriate medical treatment as acknowledged by          · Appropriate medical records of the examination and treatment of the patient are provided at the time of transfer   [address] [place] [address] [address] _______  · Transfer will be performed by qualified personnel from    and appropriate transfer equipment as required, including the use of necessary and appropriate life support measures      Provider Certification: I have examined the patient and explained the following risks and benefits of being transferred/refusing transfer to the patient/family:         Based on these reasonable risks and benefits to the patient and/or the unborn child(vinicio), and based upon the information available at the time of the patients examination, I certify that the medical benefits reasonably to be expected from the provision of appropriate medical treatments at another medical facility outweigh the increasing risks, if any, to the individuals medical condition, and in the case of labor to the unborn child, from effecting the transfer      X____________________________________________ DATE 01/29/21        TIME_______      ORIGINAL - SEND TO MEDICAL RECORDS   COPY - SEND WITH PATIENT DURING TRANSFER

## 2021-01-29 NOTE — ED PROVIDER NOTES
History  Chief Complaint   Patient presents with    Chest Pain     sicne tuesday, feels like shes "shaking inside" and states has sharp pain on the left side of chest "every once and a while     This is a 50-year-old female complains of chest pain  It is intermittent with exertion since Tuesday  Patient has a prior history of coronary artery disease, hypertension, hypercholesterolemia and diabetes  States that is better with rest   Lasts about 15 minutes when it comes  Substernal pressure associated with palpitations  There is no presyncope or shortness of breath  She rates pain as moderate when it happens  She is not really having the pain at this point  Prior to Admission Medications   Prescriptions Last Dose Informant Patient Reported?  Taking?   aspirin 81 mg chewable tablet 1/29/2021 at Unknown time  Yes Yes   Sig: Chew 81 mg daily   clotrimazole-betamethasone (LOTRISONE) 1-0 05 % cream Not Taking at Unknown time  No No   Sig: Apply topically 2 (two) times a day Apply beneath breasts   Patient not taking: Reported on 1/29/2021   fluocinonide (LIDEX) 0 05 % cream Not Taking at Unknown time  No No   Sig: Apply topically 2 (two) times a day To treat poison   Patient not taking: Reported on 1/29/2021   gabapentin (NEURONTIN) 100 mg capsule Not Taking at Unknown time  Yes No   Sig: gabapentin 100 mg capsule   glipiZIDE (GLUCOTROL) 5 mg tablet 1/29/2021 at Unknown time  Yes Yes   Sig: Take 2 5 mg by mouth daily     hydrochlorothiazide (HYDRODIURIL) 25 mg tablet 1/29/2021 at Unknown time  Yes Yes   Sig: Take 25 mg by mouth 2 (two) times a day     lisinopril (ZESTRIL) 10 mg tablet 1/29/2021 at Unknown time  No Yes   Sig: Take 1 tablet (10 mg total) by mouth daily   Patient taking differently: 20 mg 2 (two) times a day    meloxicam (MOBIC) 15 mg tablet Not Taking at Unknown time  Yes No   Sig: take 1 tablet by mouth once daily with meals   multivitamin (THERAGRAN) TABS   Yes No   Sig: Take 1 tablet by mouth daily      Facility-Administered Medications: None       Past Medical History:   Diagnosis Date    Arthritis     Diabetes mellitus ([facility] [address] )     Hypertension        Past Surgical History:   Procedure Laterality Date    HYSTERECTOMY         History reviewed  No pertinent family history  I have reviewed and agree with the history as documented  E-Cigarette/Vaping     E-Cigarette/Vaping Substances     Social History     Tobacco Use    Smoking status: Never Smoker    Smokeless tobacco: Never Used   Substance Use Topics    Alcohol use: No    Drug use: No       Review of Systems   Constitutional: Positive for activity change  Negative for chills, fatigue and fever  HENT: Negative for congestion  Eyes: Negative for visual disturbance  Respiratory: Positive for chest tightness  Negative for cough and shortness of breath  Cardiovascular: Positive for chest pain and palpitations  Negative for leg swelling  Gastrointestinal: Negative for abdominal pain, diarrhea and vomiting  Genitourinary: Negative for dysuria  Skin: Negative for rash  Neurological: Negative for syncope, speech difficulty and numbness  Physical Exam  Physical Exam  Constitutional:       General: She is not in acute distress  Appearance: She is well-developed  She is not ill-appearing or toxic-appearing  HENT:      Head: Normocephalic and atraumatic  Eyes:      Conjunctiva/sclera: Conjunctivae normal       Pupils: Pupils are equal, round, and reactive to light  Neck:      Musculoskeletal: Normal range of motion and neck supple  Cardiovascular:      Rate and Rhythm: Normal rate and regular rhythm  Heart sounds: Normal heart sounds  Pulmonary:      Effort: Pulmonary effort is normal  No respiratory distress  Breath sounds: Normal breath sounds  Abdominal:      General: Bowel sounds are normal       Palpations: Abdomen is soft  Musculoskeletal: Normal range of motion     Skin:     General: Skin is warm and dry  Capillary Refill: Capillary refill takes less than 2 seconds  Neurological:      Mental Status: She is alert and oriented to person, place, and time  Psychiatric:         Mood and Affect: Mood normal  Mood is not anxious  Behavior: Behavior normal  Behavior is not agitated  Vital Signs  ED Triage Vitals [01/29/21 1603]   Temperature Pulse Respirations Blood Pressure SpO2   98 2 °F (36 8 °C) 93 18 (!) 207/89 98 %      Temp Source Heart Rate Source Patient Position - Orthostatic VS BP Location FiO2 (%)   Temporal Monitor -- Left arm --      Pain Score       2           Vitals:    01/29/21 1603   BP: (!) 207/89   Pulse: 93         Visual Acuity      ED Medications  Medications   sodium chloride (PF) 0 9 % injection 3 mL (has no administration in time range)       Diagnostic Studies  Results Reviewed     Procedure Component Value Units Date/Time    COVID19, Influenza A/B, RSV PCR, [facility] [[id number]]  (Normal) Collected: 01/29/21 1624    Lab Status: Final result Specimen: Nares from Nasopharyngeal Swab Updated: 01/29/21 1741     SARS-CoV-2 Negative     INFLUENZA A PCR Negative     INFLUENZA B PCR Negative     RSV PCR Negative    Narrative: This test has been authorized by FDA under an EUA (Emergency Use Assay) for use by authorized laboratories  Clinical caution and judgement should be used with the interpretation of these results with consideration of the clinical impression and other laboratory testing  Testing reported as "Positive" or "Negative" has been proven to be accurate according to standard laboratory validation requirements  All testing is performed with control materials showing appropriate reactivity at standard intervals      Basic metabolic panel [[id number]]  (Abnormal) Collected: 01/29/21 1624    Lab Status: Final result Specimen: Blood from Arm, Left Updated: 01/29/21 1659     Sodium 138 mmol/L      Potassium 3 5 mmol/L      Chloride 98 mmol/L      CO2 29 mmol/L      ANION GAP 11 mmol/L      BUN 24 mg/dL      Creatinine 1 19 mg/dL      Glucose 114 mg/dL      Calcium 9 3 mg/dL      eGFR 44 ml/min/1 73sq m     Narrative:      [place] guidelines for Chronic Kidney Disease (CKD):     Stage 1 with normal or high GFR (GFR > 90 mL/min/1 73 square meters)    Stage 2 Mild CKD (GFR = 60-89 mL/min/1 73 square meters)    Stage 3A Moderate CKD (GFR = 45-59 mL/min/1 73 square meters)    Stage 3B Moderate CKD (GFR = 30-44 mL/min/1 73 square meters)    Stage 4 Severe CKD (GFR = 15-29 mL/min/1 73 square meters)    Stage 5 End Stage CKD (GFR <15 mL/min/1 73 square meters)  Note: GFR calculation is accurate only with a steady state creatinine    Troponin I [[id number]]  (Normal) Collected: 01/29/21 1624    Lab Status: Final result Specimen: Blood from Arm, Left Updated: 01/29/21 1657     Troponin I <0 03 ng/mL     CBC and differential [[id number]]  (Abnormal) Collected: 01/29/21 1624    Lab Status: Final result Specimen: Blood from Arm, Left Updated: 01/29/21 1636     WBC 6 70 Thousand/uL      RBC 4 55 Million/uL      Hemoglobin 13 6 g/dL      Hematocrit 40 2 %      MCV 88 fL      MCH 29 9 pg      MCHC 33 9 g/dL      RDW 12 8 %      MPV 8 4 fL      Platelets 118 Thousands/uL      Neutrophils Relative 63 %      Lymphocytes Relative 27 %      Monocytes Relative 8 %      Eosinophils Relative 2 %      Basophils Relative 1 %      Neutrophils Absolute 4 20 Thousands/µL      Lymphocytes Absolute 1 80 Thousands/µL      Monocytes Absolute 0 60 Thousand/µL      Eosinophils Absolute 0 10 Thousand/µL      Basophils Absolute 0 00 Thousands/µL                  X-ray chest 1 view portable   Final Result by Krishna Kaiser DO (01/29 1637)      No acute cardiopulmonary disease                    Workstation performed: [id number]                    Procedures  ECG 12 Lead Documentation Only    Date/Time: 1/29/2021 7:02 PM  Performed by: Mya Jovel MD  Authorized by: Maria Del Rosario Junior MD     ECG reviewed by me, the ED Provider: yes    Patient location:  ED  Previous ECG:     Comparison to cardiac monitor: Yes    Interpretation:     Interpretation: normal    Rate:     ECG rate assessment: normal    Rhythm:     Rhythm: sinus rhythm    Ectopy:     Ectopy: none    QRS:     QRS axis:  Normal  Conduction:     Conduction: normal    ST segments:     ST segments:  Abnormal    Elevation:  AVF  T waves:     T waves: normal               ED Course  ED Course as of Jan 29 1903 Fri Jan 29, 2021   [id number] Discussed case with [place] [address] slim who accepted the patient  Patient to be picked up at 8:00 p m  [facility] HEART Risk Score      Most Recent Value   Heart Score Risk Calculator   History  2 Filed at: 01/29/2021 1822   ECG  1 Filed at: 01/29/2021 1172   Age  2 Filed at: 01/29/2021 1822   Risk Factors  2 Filed at: 01/29/2021 1822   Troponin  0 Filed at: 01/29/2021 1822   HEART Score  7 Filed at: 01/29/2021 4315                                    [facility]  Number of Diagnoses or Management Options  Chest pain: new and requires workup  Diagnosis management comments: This is a 42-year-old female chest pain  I have significant concern for unstable angina  Case discussed with Dr Sugar Garcia in of cardiology who agreed that that for seemed reasonable  We were unable to do stress test or catheterization at this facility  Patient transferred to [place] [address] where she can receive further testing and Cardiology evaluation in person  Patient States understanding and agreement with the plan           Amount and/or Complexity of Data Reviewed  Clinical lab tests: reviewed and ordered  Tests in the radiology section of CPT®: ordered and reviewed  Discuss the patient with other providers: yes  Independent visualization of images, tracings, or specimens: yes        Disposition  Final diagnoses:   Chest pain     Time reflects when diagnosis was documented in both MDM as applicable and the Disposition within this note     Time User Action Codes Description Comment    1/29/2021  6:06 PM Charlotteivan Noyola Add [R07 9] Chest pain       ED Disposition     ED Disposition Condition Date/Time Comment    Transfer to Another Facility-In Network  Fri Jan 29, 4675  1:66 PM Chalo Jalloh should be transferred out to [place]    Follow-up Information    None         Patient's Medications   Discharge Prescriptions    No medications on file     No discharge procedures on file      PDMP Review     None          ED Provider  Electronically Signed by           Angel James MD  01/29/21 7015

## 2021-01-30 PROBLEM — I49.3 PVC'S (PREMATURE VENTRICULAR CONTRACTIONS): Status: ACTIVE | Noted: 2021-01-30

## 2021-01-30 LAB
ATRIAL RATE: 97 BPM
CHOLEST SERPL-MCNC: 154 MG/DL (ref 50–200)
EST. AVERAGE GLUCOSE BLD GHB EST-MCNC: 123 MG/DL
GLUCOSE SERPL-MCNC: 102 MG/DL (ref 65–140)
HBA1C MFR BLD: 5.9 %
HDLC SERPL-MCNC: 49 MG/DL
LDLC SERPL CALC-MCNC: 96 MG/DL (ref 0–100)
P AXIS: 58 DEGREES
PR INTERVAL: 144 MS
QRS AXIS: 53 DEGREES
QRSD INTERVAL: 88 MS
QT INTERVAL: 348 MS
QTC INTERVAL: 441 MS
T WAVE AXIS: 56 DEGREES
TRIGL SERPL-MCNC: 43 MG/DL
TROPONIN I SERPL-MCNC: 0.02 NG/ML
TROPONIN I SERPL-MCNC: 0.02 NG/ML
VENTRICULAR RATE: 97 BPM

## 2021-01-30 PROCEDURE — 99222 1ST HOSP IP/OBS MODERATE 55: CPT | Performed by: INTERNAL MEDICINE

## 2021-01-30 PROCEDURE — 99232 SBSQ HOSP IP/OBS MODERATE 35: CPT | Performed by: PHYSICIAN ASSISTANT

## 2021-01-30 PROCEDURE — 80061 LIPID PANEL: CPT | Performed by: STUDENT IN AN ORGANIZED HEALTH CARE EDUCATION/TRAINING PROGRAM

## 2021-01-30 PROCEDURE — 82948 REAGENT STRIP/BLOOD GLUCOSE: CPT

## 2021-01-30 PROCEDURE — 84484 ASSAY OF TROPONIN QUANT: CPT | Performed by: PHYSICIAN ASSISTANT

## 2021-01-30 PROCEDURE — 93010 ELECTROCARDIOGRAM REPORT: CPT | Performed by: INTERNAL MEDICINE

## 2021-01-30 PROCEDURE — 83036 HEMOGLOBIN GLYCOSYLATED A1C: CPT | Performed by: STUDENT IN AN ORGANIZED HEALTH CARE EDUCATION/TRAINING PROGRAM

## 2021-01-30 RX ORDER — METOPROLOL SUCCINATE 25 MG/1
25 TABLET, EXTENDED RELEASE ORAL DAILY
Status: DISCONTINUED | OUTPATIENT
Start: 2021-01-30 | End: 2021-01-30

## 2021-01-30 RX ADMIN — HYDROCHLOROTHIAZIDE 25 MG: 25 TABLET ORAL at 08:33

## 2021-01-30 RX ADMIN — METOPROLOL TARTRATE 25 MG: 25 TABLET, FILM COATED ORAL at 21:22

## 2021-01-30 RX ADMIN — METOPROLOL TARTRATE 25 MG: 25 TABLET, FILM COATED ORAL at 14:06

## 2021-01-30 RX ADMIN — GABAPENTIN 100 MG: 100 CAPSULE ORAL at 08:34

## 2021-01-30 RX ADMIN — HEPARIN SODIUM 5000 UNITS: 5000 INJECTION INTRAVENOUS; SUBCUTANEOUS at 21:23

## 2021-01-30 RX ADMIN — LISINOPRIL 5 MG: 5 TABLET ORAL at 21:22

## 2021-01-30 RX ADMIN — HEPARIN SODIUM 5000 UNITS: 5000 INJECTION INTRAVENOUS; SUBCUTANEOUS at 14:06

## 2021-01-30 RX ADMIN — HEPARIN SODIUM 5000 UNITS: 5000 INJECTION INTRAVENOUS; SUBCUTANEOUS at 06:14

## 2021-01-30 RX ADMIN — LISINOPRIL 5 MG: 5 TABLET ORAL at 08:34

## 2021-01-30 RX ADMIN — ASPIRIN 81 MG: 81 TABLET, CHEWABLE ORAL at 08:34

## 2021-01-30 NOTE — ASSESSMENT & PLAN NOTE
/90 on admission now -140's   Stop HCTZ   Start lopressor 25 mg bid   Continue lisinopril 5 mg daily   Monitor VS trend

## 2021-01-30 NOTE — ASSESSMENT & PLAN NOTE
Somewhat mixed presentation  Patient has waxing waning exertional chest pain concerning for possible type 1 versus unstable angina over last 4 days  Blood pressure is significantly elevated which would be a possibility for type 2 MI versus strain, however patient's blood pressure normally is 120s 130s which checked daily  -EKG without any ischemic changes in 2+ contiguous leads    There was question for possible ST depressions in inferior leads on rhythm strip however these appear to be related to underlying arrhythmia versus wandering baseline  -will repeat EKG and troponin x2 to rule out NSTEMI versus type 2 MI   -check FLP and hemoglobin A1c for risk stratification  -this patient has been symptomatic since arrival to emergency room will get aspirin 243 mg and nitroglycerin sublingual tablet x1  -patient was transferred for possible cardiac catheterization, given no known atherosclerotic disease may actually benefit from stress test versus catheterization, will consult Cardiology for further evaluation

## 2021-01-30 NOTE — H&P
H&P- Scott Mitchell [birth year], 67 y o  female MRN: [id number]    Unit/Bed#: E4 -01 Encounter: [id number]    Primary Care Provider: Beth Barrios DO   Date and time admitted to hospital: 1/29/2021  9:30 PM        * Chest pain  Assessment & Plan  Somewhat mixed presentation  Patient has waxing waning exertional chest pain concerning for possible type 1 versus unstable angina over last 4 days  Blood pressure is significantly elevated which would be a possibility for type 2 MI versus strain, however patient's blood pressure normally is 120s 130s which checked daily  -EKG without any ischemic changes in 2+ contiguous leads  There was question for possible ST depressions in inferior leads on rhythm strip however these appear to be related to underlying arrhythmia versus wandering baseline  -will repeat EKG and troponin x2 to rule out NSTEMI versus type 2 MI   -check FLP and hemoglobin A1c for risk stratification  -this patient has been symptomatic since arrival to emergency room will get aspirin 243 mg and nitroglycerin sublingual tablet x1  -patient was transferred for possible cardiac catheterization, given no known atherosclerotic disease may actually benefit from stress test versus catheterization, will consult Cardiology for further evaluation    Controlled type 2 diabetes mellitus, without long-term current use of insulin ([facility] [address] )  Assessment & Plan  No results found for: HGBA1C    Recent Labs     01/29/21  2140   POCGLU 115       Blood Sugar Average: Last 72 hrs:  (P) 115   On glipizide  Hold glipizide start ssi/poc bs check hgb a1c    Accelerated hypertension  Assessment & Plan  Vs htn emergency given cp  Pt reports she checks bp daily which is 120-130s  No recent nsaids but did get steroid injection in her foot 2 weeks ago  Continue ace-I and hctz   Start iv labetalol 10mg prn sbp >180mmHg after trial of ntg    History of cardiac arrest  Assessment & Plan  Patient reports history of heart attack 40 years ago postoperatively from hysterectomy  She denies any subsequent is stents or bypass, raising concern for takotsubo cardiomyopathy  She had stress test in 2018 which was negative for ischemia and does not follow with a cardiologist   She does not recall any cardiac catheterization          VTE Prophylaxis: Heparin  / sequential compression device   Code Status: FC  POLST: There is no POLST form on file for this patient (pre-hospital)  Discussion with family:     Anticipated Length of Stay:  Patient will be admitted on an Inpatient basis with an anticipated length of stay of  Greater than 2 midnights  Justification for Hospital Stay: cp    Total Time for Visit, including Counseling / Coordination of Care: 45 minutes  Greater than 50% of this total time spent on direct patient counseling and coordination of care  Chief Complaint:   cp    History of Present Illness:    Jimmie Cardenas is a 67 y o  female who presents with pmh of cardiac arrest, htn  Niddm coming to hospital for cp  Patient reports that over the last 4 days she has been having substernal nonradiating chest tightness associated with palpitations  She reports that this started in early morning after she went to the bathroom was getting ready for the day  She noted walking around and getting ready in her bedroom she started developed substernal nonradiating chest tightness  It was associated with palpitations  She reports that this would come and go and sometimes worse in intensity  She notes the only thing that alleviated it was lying down and resting  She noted it was not worse with inspiration not worse with coughing or palpation  Is not worse with oral intake  Was also notably not worsened when climbing the 14 steps in her home  She reports she has never had anything like this before  She does not have any associated nausea vomiting fevers or chills  She had no shortness of breath    She did note that she had associated burping with this  She she also reports that she checks her blood pressure daily and typically is 120-130 mm per mercury  She reports she only checks in the morning, however  She notes that she does not take any NSAIDs since last 2-3 weeks  She used to take this due to pain in her left foot in her right knee  She just got a in steroid injection into her left foot approximately 2 weeks ago  She notes this typically causes her blood pressure to rise and her blood sugars  She has not take any OTC cough or cold meds  She does have snoring chronically but has never been tested for sleep apnea  Patient came to the ED and [place]  Her initial blood pressure was 936 systolic  She reports that she had chest pain throughout her visit to the emergency room  She was then transported to [facility]  for further evaluation  She reports that she has had chest pain throughout this entire time and unfortunately has not received aspirin or nitroglycerin pre-hospital or 1 emergency room      She reports she had a stress test 2 years ago that was negative for any ischemia  She reports a history of cardiac arrest when in 30-40 years ago she had a postoperative heart attack  She reports that she went under for the procedure and then woke up in the ICU  She does not recall cardiac catheterization  She has no stents or open bypass  No family hx of MI or coronary stenting  Review of Systems:    Review of Systems   Constitutional: Negative for appetite change, chills and fever  Respiratory: Negative for cough and shortness of breath  Cardiovascular: Positive for chest pain and palpitations  Negative for leg swelling  Gastrointestinal: Negative for abdominal pain, diarrhea, nausea and vomiting  All other systems reviewed and are negative        Past Medical and Surgical History:     Past Medical History:   Diagnosis Date    Arthritis     Diabetes mellitus ([facility] [address] )     Hypertension        Past Surgical History:   Procedure Laterality Date    HYSTERECTOMY         Meds/Allergies:    Prior to Admission medications    Medication Sig Start Date End Date Taking? Authorizing Provider   aspirin 81 mg chewable tablet Chew 81 mg daily   Yes Historical Provider, MD   glipiZIDE (GLUCOTROL) 5 mg tablet Take 2 5 mg by mouth daily     Yes Historical Provider, MD   hydrochlorothiazide (HYDRODIURIL) 25 mg tablet Take 25 mg by mouth 2 (two) times a day     Yes Historical Provider, MD   lisinopril (ZESTRIL) 10 mg tablet Take 1 tablet (10 mg total) by mouth daily  Patient taking differently: 10 mg 2 (two) times a day Patient states she takes 5 mg tablets BID, one in the morning and one at night 9/18/18  Yes Cooper Celeste,    multivitamin [facility]) TABS Take 1 tablet by mouth daily   Yes Historical Provider, MD   clotrimazole-betamethasone (LOTRISONE) 1-0 05 % cream Apply topically 2 (two) times a day Apply beneath breasts  Patient not taking: Reported on 1/29/2021 8/2/19   Robyn Bernabe PA-C   fluocinonide (LIDEX) 0 05 % cream Apply topically 2 (two) times a day To treat poison  Patient not taking: Reported on 1/29/2021 8/2/19   Robyn Bernabe PA-C   gabapentin (NEURONTIN) 100 mg capsule gabapentin 100 mg capsule    Historical Provider, MD   meloxicam (MOBIC) 15 mg tablet take 1 tablet by mouth once daily with meals    Historical Provider, MD     I have reviewed home medications with patient personally  Allergies: No Known Allergies    Social History:     Marital Status:     Occupation:   Patient Pre-hospital Living Situation:   Patient Pre-hospital Level of Mobility:   Patient Pre-hospital Diet Restrictions:   Substance Use History:   Social History     Substance and Sexual Activity   Alcohol Use No     Social History     Tobacco Use   Smoking Status Never Smoker   Smokeless Tobacco Never Used     Social History     Substance and Sexual Activity   Drug Use No       Family History:    no family hx of mi in parents siblings or children    Physical Exam:     Vitals:   Blood Pressure: (!) 179/90 (01/29/21 2127)  Pulse: 92 (01/29/21 2127)  Temperature: 98 °F (36 7 °C) (01/29/21 2127)  Temp Source: Temporal (01/29/21 2127)  Respirations: 20 (01/29/21 2127)  Height: 5' 4" (162 6 cm) (01/29/21 2127)  Weight - Scale: 74 6 kg (164 lb 8 oz) (01/29/21 2127)  SpO2: 96 % (01/29/21 2127)    Physical Exam  Vitals signs reviewed  Constitutional:       General: She is not in acute distress  Appearance: She is not ill-appearing, toxic-appearing or diaphoretic  HENT:      Head: Normocephalic and atraumatic  Right Ear: External ear normal       Nose: Nose normal       Mouth/Throat:      Mouth: Mucous membranes are dry  Eyes:      Extraocular Movements: Extraocular movements intact  Neck:      Musculoskeletal: Normal range of motion  Cardiovascular:      Rate and Rhythm: Normal rate and regular rhythm  Heart sounds: No murmur  No friction rub  No gallop  Pulmonary:      Effort: Pulmonary effort is normal  No respiratory distress  Breath sounds: No wheezing, rhonchi or rales  Chest:      Chest wall: No tenderness  Abdominal:      General: There is no distension  Palpations: Abdomen is soft  There is no mass  Tenderness: There is no guarding or rebound  Hernia: No hernia is present  Musculoskeletal:      Right lower leg: No edema  Left lower leg: No edema  Skin:     General: Skin is warm and dry  Neurological:      Mental Status: She is alert  Mental status is at baseline  (  Be Sure to Include Physical Exam: Delete this entire line when you have entered your exam)    Additional Data:     Lab Results: I have personally reviewed pertinent reports        Results from last 7 days   Lab Units 01/29/21  1624   WBC Thousand/uL 6 70   HEMOGLOBIN g/dL 13 6   HEMATOCRIT % 40 2*   PLATELETS Thousands/uL 242   NEUTROS PCT % 63   LYMPHS PCT % 27   MONOS PCT % 8   EOS PCT % 2     Results from last 7 days   Lab Units 01/29/21  1624   SODIUM mmol/L 138   POTASSIUM mmol/L 3 5   CHLORIDE mmol/L 98   CO2 mmol/L 29   BUN mg/dL 24   CREATININE mg/dL 1 19   ANION GAP mmol/L 11   CALCIUM mg/dL 9 3   GLUCOSE RANDOM mg/dL 114*         Results from last 7 days   Lab Units 01/29/21  2140   POC GLUCOSE mg/dl 115               Imaging: I have personally reviewed pertinent films in PACS and No widened mediastinum no acute cardiopulmonary disease on wet read    No orders to display       EKG, Pathology, and Other Studies Reviewed on Admission:   · EKG:  Normal sinus rhythm  There is PVC  There is subtle ST depression in V5 and questionable in before versus due to underlying PVC    Rhythm strips reviewed by EMS there is a frequent PVC versus sinus arrhythmia with associated ST segment changes due to PVC do not appear to be consistent with ischemic changes    Allscripts / Epic Records Reviewed: Yes     ** Please Note: This note has been constructed using a voice recognition system   **

## 2021-01-30 NOTE — ED NOTES
Pt admitted to [facility] to Dr Perez Reasons  [id number]   Transferred by [facility] ambulance     Nico Narvaez RN  01/29/21 2033

## 2021-01-30 NOTE — ASSESSMENT & PLAN NOTE
Lab Results   Component Value Date    HGBA1C 5 9 (H) 01/30/2021       Recent Labs     01/29/21  2140 01/30/21  0802   POCGLU 115 102       Blood Sugar Average: Last 72 hrs:  (P) 108 5   On glipizide with excellent A1c control, given advanced age would stop this upon discharge to prevent hypoglycemia   SSI while here - has not required coverage

## 2021-01-30 NOTE — ASSESSMENT & PLAN NOTE
Patient reports history of heart attack 40 years ago postoperatively from hysterectomy  She denies any subsequent is stents or bypass, raising concern for takotsubo cardiomyopathy    She had stress test in 2018 which was negative for ischemia and does not follow with a cardiologist   She does not recall any cardiac catheterization

## 2021-01-30 NOTE — ASSESSMENT & PLAN NOTE
Patient has waxing waning exertional chest pain for several days PTA   Blood pressure 179/90 on admission   Troponin negative x 3   This morning having symptoms that appear to be related to PVCs with symptoms of "flutter" in chest   Will start lopressor 25 mg bid   Stop HCTZ   Follow up with Cardiology recommendations regarding further ischemic testing   Prn ECG for chest pain   LDL 96   A1c 5 9%

## 2021-01-30 NOTE — PLAN OF CARE
Problem: Potential for Falls  Goal: Patient will remain free of falls  Description: INTERVENTIONS:  - Assess patient frequently for physical needs  -  Identify cognitive and physical deficits and behaviors that affect risk of falls  -  [place] fall precautions as indicated by assessment   - Educate patient/family on patient safety including physical limitations  - Instruct patient to call for assistance with activity based on assessment  - Modify environment to reduce risk of injury  - Consider OT/PT consult to assist with strengthening/mobility  Outcome: Progressing     Problem: PAIN - ADULT  Goal: Verbalizes/displays adequate comfort level or baseline comfort level  Description: Interventions:  - Encourage patient to monitor pain and request assistance  - Assess pain using appropriate pain scale  - Administer analgesics based on type and severity of pain and evaluate response  - Implement non-pharmacological measures as appropriate and evaluate response  - Consider cultural and social influences on pain and pain management  - Notify physician/advanced practitioner if interventions unsuccessful or patient reports new pain  Outcome: Progressing     Problem: SAFETY ADULT  Goal: Patient will remain free of falls  Description: INTERVENTIONS:  - Assess patient frequently for physical needs  -  Identify cognitive and physical deficits and behaviors that affect risk of falls    -  [place] fall precautions as indicated by assessment   - Educate patient/family on patient safety including physical limitations  - Instruct patient to call for assistance with activity based on assessment  - Modify environment to reduce risk of injury  - Consider OT/PT consult to assist with strengthening/mobility  Outcome: Progressing  Goal: Maintain or return to baseline ADL function  Description: INTERVENTIONS:  -  Assess patient's ability to carry out ADLs; assess patient's baseline for ADL function and identify physical deficits which impact ability to perform ADLs (bathing, care of mouth/teeth, toileting, grooming, dressing, etc )  - Assess/evaluate cause of self-care deficits   - Assess range of motion  - Assess patient's mobility; develop plan if impaired  - Assess patient's need for assistive devices and provide as appropriate  - Encourage maximum independence but intervene and supervise when necessary  - Involve family in performance of ADLs  - Assess for home care needs following discharge   - Consider OT consult to assist with ADL evaluation and planning for discharge  - Provide patient education as appropriate  Outcome: Progressing  Goal: Maintain or return mobility status to optimal level  Description: INTERVENTIONS:  - Assess patient's baseline mobility status (ambulation, transfers, stairs, etc )    - Identify cognitive and physical deficits and behaviors that affect mobility  - Identify mobility aids required to assist with transfers and/or ambulation (gait belt, sit-to-stand, lift, walker, cane, etc )  - [place] fall precautions as indicated by assessment  - Record patient progress and toleration of activity level on Mobility SBAR; progress patient to next Phase/Stage  - Instruct patient to call for assistance with activity based on assessment  - Consider rehabilitation consult to assist with strengthening/weightbearing, etc   Outcome: Progressing     Problem: DISCHARGE PLANNING  Goal: Discharge to home or other facility with appropriate resources  Description: INTERVENTIONS:  - Identify barriers to discharge w/patient and caregiver  - Arrange for needed discharge resources and transportation as appropriate  - Identify discharge learning needs (meds, wound care, etc )  - Arrange for interpretive services to assist at discharge as needed  - Refer to Case Management Department for coordinating discharge planning if the patient needs post-hospital services based on physician/advanced practitioner order or complex needs related to functional status, cognitive ability, or social support system  Outcome: Progressing     Problem: Knowledge Deficit  Goal: Patient/family/caregiver demonstrates understanding of disease process, treatment plan, medications, and discharge instructions  Description: Complete learning assessment and assess knowledge base    Interventions:  - Provide teaching at level of understanding  - Provide teaching via preferred learning methods  Outcome: Progressing     Problem: CARDIOVASCULAR - ADULT  Goal: Maintains optimal cardiac output and hemodynamic stability  Description: INTERVENTIONS:  - Monitor I/O, vital signs and rhythm  - Monitor for S/S and trends of decreased cardiac output  - Administer and titrate ordered vasoactive medications to optimize hemodynamic stability  - Assess quality of pulses, skin color and temperature  - Assess for signs of decreased coronary artery perfusion  - Instruct patient to report change in severity of symptoms  Outcome: Progressing  Goal: Absence of cardiac dysrhythmias or at baseline rhythm  Description: INTERVENTIONS:  - Continuous cardiac monitoring, vital signs, obtain 12 lead EKG if ordered  - Administer antiarrhythmic and heart rate control medications as ordered  - Monitor electrolytes and administer replacement therapy as ordered  Outcome: Progressing

## 2021-01-30 NOTE — ASSESSMENT & PLAN NOTE
Vs htn emergency given cp  Pt reports she checks bp daily which is 120-130s  No recent nsaids but did get steroid injection in her foot 2 weeks ago  Continue ace-I and hctz   Start iv labetalol 10mg prn sbp >180mmHg after trial of ntg

## 2021-01-30 NOTE — PROGRESS NOTES
Progress Note - Jael Jimenez [birth year], 67 y o  female MRN: [id number]  Unit/Bed#: E4 -01 Encounter: [id number]  Primary Care Provider: Peterson Krishnan DO   Date and time admitted to hospital: 1/29/2021  9:30 PM      * Chest pain  Assessment & Plan  Patient has waxing waning exertional chest pain for several days PTA   Blood pressure 179/90 on admission   Troponin negative x 3   This morning having symptoms that appear to be related to PVCs with symptoms of "flutter" in chest   Will start lopressor 25 mg bid   Stop HCTZ   Follow up with Cardiology recommendations regarding further ischemic testing   Prn ECG for chest pain   LDL 96   A1c 5 9%     Accelerated hypertension  Assessment & Plan  /90 on admission now -140's   Stop HCTZ   Start lopressor 25 mg bid   Continue lisinopril 5 mg daily   Monitor VS trend       Controlled type 2 diabetes mellitus, without long-term current use of insulin (HCC)  Assessment & Plan  Lab Results   Component Value Date    HGBA1C 5 9 (H) 01/30/2021       Recent Labs     01/29/21  2140 01/30/21  0802   POCGLU 115 102       Blood Sugar Average: Last 72 hrs:  (P) 108 5   On glipizide with excellent A1c control, given advanced age would stop this upon discharge to prevent hypoglycemia   SSI while here - has not required coverage       PVCs/PACs   - start lopressor 25 mg bid and monitor symptoms     VTE Pharmacologic Prophylaxis:   Pharmacologic: Heparin  Mechanical VTE Prophylaxis in Place: Yes    Discussions with Specialists or Other Care Team Provider:     Education and Discussions with Family / Patient: patient at the bedside, spoke with patients son Lashawn Olivas on the phone and reviewed plan of care and answered all questions     Time Spent for Care: 20 minutes  More than 50% of total time spent on counseling and coordination of care as described above      Current Length of Stay: 1 day(s)    Current Patient Status: Inpatient   Certification Statement: The patient will continue to require additional inpatient hospital stay due to chest pain     Discharge Plan: pending workup from cardiology     Code Status: Level 1 - Full Code      Subjective:   Patient doing well  No specific chest pain but has "fluttering" symptoms in her chest  Some last short period and sometimes persists  No SOB  No respiratory complaints  No lightheadedness or dizziness  No abdominal pain, nausea or vomiting  Objective:     Vitals:   Temp (24hrs), Av °F (36 7 °C), Min:97 8 °F (36 6 °C), Max:98 2 °F (36 8 °C)    Temp:  [97 8 °F (36 6 °C)-98 2 °F (36 8 °C)] 98 1 °F (36 7 °C)  HR:  [63-93] 74  Resp:  [18-21] 18  BP: (139-207)/(67-90) 141/67  SpO2:  [95 %-98 %] 95 %  Body mass index is 28 24 kg/m²  Input and Output Summary (last 24 hours): Intake/Output Summary (Last 24 hours) at 2021 1259  Last data filed at 2021 2301  Gross per 24 hour   Intake 240 ml   Output --   Net 240 ml       Physical Exam:     Physical Exam  Vitals signs and nursing note reviewed  HENT:      Head: Normocephalic  Eyes:      Conjunctiva/sclera: Conjunctivae normal    Cardiovascular:      Rate and Rhythm: Normal rate  Comments: Frequent PVCs  Pulmonary:      Effort: Pulmonary effort is normal  No respiratory distress  Breath sounds: Normal breath sounds  No wheezing or rales  Abdominal:      General: Bowel sounds are normal       Palpations: Abdomen is soft  Musculoskeletal:      Right lower leg: No edema  Left lower leg: No edema  Skin:     General: Skin is warm  Neurological:      Mental Status: She is alert and oriented to person, place, and time     Psychiatric:         Mood and Affect: Mood normal          Additional Data:     Labs:    Results from last 7 days   Lab Units 21  1624   WBC Thousand/uL 6 70   HEMOGLOBIN g/dL 13 6   HEMATOCRIT % 40 2*   PLATELETS Thousands/uL 242   NEUTROS PCT % 63   LYMPHS PCT % 27   MONOS PCT % 8   EOS PCT % 2     Results from last 7 days   Lab Units 01/29/21  1624   SODIUM mmol/L 138   POTASSIUM mmol/L 3 5   CHLORIDE mmol/L 98   CO2 mmol/L 29   BUN mg/dL 24   CREATININE mg/dL 1 19   ANION GAP mmol/L 11   CALCIUM mg/dL 9 3   GLUCOSE RANDOM mg/dL 114*         Results from last 7 days   Lab Units 01/30/21  0802 01/29/21  2140   POC GLUCOSE mg/dl 102 115     Results from last 7 days   Lab Units 01/30/21  0217   HEMOGLOBIN A1C % 5 9*               * I Have Reviewed All Lab Data Listed Above  * Additional Pertinent Lab Tests Reviewed: All Labs Within Last 24 Hours Reviewed    Imaging:    Imaging Reports Reviewed Today Include: reviewed CXR  Imaging Personally Reviewed by Myself Includes:      Recent Cultures (last 7 days):           Last 24 Hours Medication List:   Current Facility-Administered Medications   Medication Dose Route Frequency Provider Last Rate    acetaminophen  650 mg Oral Q6H PRN Taisha Chatman PA-C      aspirin  81 mg Oral Daily Taisha Chatman PA-C      gabapentin  100 mg Oral Daily Taisha Chatman PA-C      heparin (porcine)  5,000 Units Subcutaneous Q8H 3 [facility] FRANCES Chatman PA-C      Labetalol HCl  10 mg Intravenous Q4H PRN Taisha Chatman PA-C      lisinopril  5 mg Oral BID Taisha Chatman PA-C      metoprolol tartrate  25 mg Oral Q12H [address] Melly Nickerson PA-C      morphine injection  2 mg Intravenous Q4H PRN Taisha Chatman PA-C      ondansetron  4 mg Intravenous Q6H PRN Taisha Briscoe PA-C          Today, Patient Was Seen By: Tereza Franco PA-C    ** Please Note: Dictation voice to text software may have been used in the creation of this document   **

## 2021-01-30 NOTE — ASSESSMENT & PLAN NOTE
No results found for: HGBA1C    Recent Labs     01/29/21  2140   POCGLU 115       Blood Sugar Average: Last 72 hrs:  (P) 115   On glipizide  Hold glipizide start ssi/poc bs check hgb a1c

## 2021-01-30 NOTE — PLAN OF CARE
Problem: Potential for Falls  Goal: Patient will remain free of falls  Description: INTERVENTIONS:  - Assess patient frequently for physical needs  -  Identify cognitive and physical deficits and behaviors that affect risk of falls  -  [place] fall precautions as indicated by assessment   - Educate patient/family on patient safety including physical limitations  - Instruct patient to call for assistance with activity based on assessment  - Modify environment to reduce risk of injury  - Consider OT/PT consult to assist with strengthening/mobility  Outcome: Progressing     Problem: PAIN - ADULT  Goal: Verbalizes/displays adequate comfort level or baseline comfort level  Description: Interventions:  - Encourage patient to monitor pain and request assistance  - Assess pain using appropriate pain scale  - Administer analgesics based on type and severity of pain and evaluate response  - Implement non-pharmacological measures as appropriate and evaluate response  - Consider cultural and social influences on pain and pain management  - Notify physician/advanced practitioner if interventions unsuccessful or patient reports new pain  Outcome: Progressing     Problem: SAFETY ADULT  Goal: Patient will remain free of falls  Description: INTERVENTIONS:  - Assess patient frequently for physical needs  -  Identify cognitive and physical deficits and behaviors that affect risk of falls    -  [place] fall precautions as indicated by assessment   - Educate patient/family on patient safety including physical limitations  - Instruct patient to call for assistance with activity based on assessment  - Modify environment to reduce risk of injury  - Consider OT/PT consult to assist with strengthening/mobility  Outcome: Progressing  Goal: Maintain or return to baseline ADL function  Description: INTERVENTIONS:  -  Assess patient's ability to carry out ADLs; assess patient's baseline for ADL function and identify physical deficits which impact ability to perform ADLs (bathing, care of mouth/teeth, toileting, grooming, dressing, etc )  - Assess/evaluate cause of self-care deficits   - Assess range of motion  - Assess patient's mobility; develop plan if impaired  - Assess patient's need for assistive devices and provide as appropriate  - Encourage maximum independence but intervene and supervise when necessary  - Involve family in performance of ADLs  - Assess for home care needs following discharge   - Consider OT consult to assist with ADL evaluation and planning for discharge  - Provide patient education as appropriate  Outcome: Progressing  Goal: Maintain or return mobility status to optimal level  Description: INTERVENTIONS:  - Assess patient's baseline mobility status (ambulation, transfers, stairs, etc )    - Identify cognitive and physical deficits and behaviors that affect mobility  - Identify mobility aids required to assist with transfers and/or ambulation (gait belt, sit-to-stand, lift, walker, cane, etc )  - [place] fall precautions as indicated by assessment  - Record patient progress and toleration of activity level on Mobility SBAR; progress patient to next Phase/Stage  - Instruct patient to call for assistance with activity based on assessment  - Consider rehabilitation consult to assist with strengthening/weightbearing, etc   Outcome: Progressing     Problem: DISCHARGE PLANNING  Goal: Discharge to home or other facility with appropriate resources  Description: INTERVENTIONS:  - Identify barriers to discharge w/patient and caregiver  - Arrange for needed discharge resources and transportation as appropriate  - Identify discharge learning needs (meds, wound care, etc )  - Arrange for interpretive services to assist at discharge as needed  - Refer to Case Management Department for coordinating discharge planning if the patient needs post-hospital services based on physician/advanced practitioner order or complex needs related to functional status, cognitive ability, or social support system  Outcome: Progressing     Problem: Knowledge Deficit  Goal: Patient/family/caregiver demonstrates understanding of disease process, treatment plan, medications, and discharge instructions  Description: Complete learning assessment and assess knowledge base    Interventions:  - Provide teaching at level of understanding  - Provide teaching via preferred learning methods  Outcome: Progressing     Problem: CARDIOVASCULAR - ADULT  Goal: Maintains optimal cardiac output and hemodynamic stability  Description: INTERVENTIONS:  - Monitor I/O, vital signs and rhythm  - Monitor for S/S and trends of decreased cardiac output  - Administer and titrate ordered vasoactive medications to optimize hemodynamic stability  - Assess quality of pulses, skin color and temperature  - Assess for signs of decreased coronary artery perfusion  - Instruct patient to report change in severity of symptoms  Outcome: Progressing  Goal: Absence of cardiac dysrhythmias or at baseline rhythm  Description: INTERVENTIONS:  - Continuous cardiac monitoring, vital signs, obtain 12 lead EKG if ordered  - Administer antiarrhythmic and heart rate control medications as ordered  - Monitor electrolytes and administer replacement therapy as ordered  Outcome: Progressing

## 2021-01-31 PROBLEM — R79.89 ELEVATED SERUM CREATININE: Status: ACTIVE | Noted: 2021-01-31

## 2021-01-31 PROBLEM — I49.1 PAC (PREMATURE ATRIAL CONTRACTION): Status: ACTIVE | Noted: 2021-01-30

## 2021-01-31 LAB
ANION GAP SERPL CALCULATED.3IONS-SCNC: 7 MMOL/L (ref 4–13)
BASOPHILS # BLD AUTO: 0.05 THOUSANDS/ΜL (ref 0–0.1)
BASOPHILS NFR BLD AUTO: 1 % (ref 0–1)
BUN SERPL-MCNC: 30 MG/DL (ref 5–25)
CALCIUM SERPL-MCNC: 9.3 MG/DL (ref 8.3–10.1)
CHLORIDE SERPL-SCNC: 100 MMOL/L (ref 100–108)
CO2 SERPL-SCNC: 30 MMOL/L (ref 21–32)
CREAT SERPL-MCNC: 1.46 MG/DL (ref 0.6–1.3)
EOSINOPHIL # BLD AUTO: 0.16 THOUSAND/ΜL (ref 0–0.61)
EOSINOPHIL NFR BLD AUTO: 3 % (ref 0–6)
ERYTHROCYTE [DISTWIDTH] IN BLOOD BY AUTOMATED COUNT: 11.9 % (ref 11.6–15.1)
GFR SERPL CREATININE-BSD FRML MDRD: 35 ML/MIN/1.73SQ M
GLUCOSE SERPL-MCNC: 113 MG/DL (ref 65–140)
HCT VFR BLD AUTO: 37.9 % (ref 34.8–46.1)
HGB BLD-MCNC: 13 G/DL (ref 11.5–15.4)
IMM GRANULOCYTES # BLD AUTO: 0.02 THOUSAND/UL (ref 0–0.2)
IMM GRANULOCYTES NFR BLD AUTO: 0 % (ref 0–2)
LYMPHOCYTES # BLD AUTO: 1.58 THOUSANDS/ΜL (ref 0.6–4.47)
LYMPHOCYTES NFR BLD AUTO: 28 % (ref 14–44)
MCH RBC QN AUTO: 30.7 PG (ref 26.8–34.3)
MCHC RBC AUTO-ENTMCNC: 34.3 G/DL (ref 31.4–37.4)
MCV RBC AUTO: 89 FL (ref 82–98)
MONOCYTES # BLD AUTO: 0.58 THOUSAND/ΜL (ref 0.17–1.22)
MONOCYTES NFR BLD AUTO: 10 % (ref 4–12)
NEUTROPHILS # BLD AUTO: 3.31 THOUSANDS/ΜL (ref 1.85–7.62)
NEUTS SEG NFR BLD AUTO: 58 % (ref 43–75)
NRBC BLD AUTO-RTO: 0 /100 WBCS
PLATELET # BLD AUTO: 216 THOUSANDS/UL (ref 149–390)
PMV BLD AUTO: 9.9 FL (ref 8.9–12.7)
POTASSIUM SERPL-SCNC: 3.9 MMOL/L (ref 3.5–5.3)
RBC # BLD AUTO: 4.24 MILLION/UL (ref 3.81–5.12)
SODIUM SERPL-SCNC: 137 MMOL/L (ref 136–145)
WBC # BLD AUTO: 5.7 THOUSAND/UL (ref 4.31–10.16)

## 2021-01-31 PROCEDURE — 80048 BASIC METABOLIC PNL TOTAL CA: CPT | Performed by: PHYSICIAN ASSISTANT

## 2021-01-31 PROCEDURE — 85025 COMPLETE CBC W/AUTO DIFF WBC: CPT | Performed by: PHYSICIAN ASSISTANT

## 2021-01-31 PROCEDURE — 99232 SBSQ HOSP IP/OBS MODERATE 35: CPT | Performed by: INTERNAL MEDICINE

## 2021-01-31 PROCEDURE — 99232 SBSQ HOSP IP/OBS MODERATE 35: CPT | Performed by: PHYSICIAN ASSISTANT

## 2021-01-31 RX ADMIN — METOPROLOL TARTRATE 25 MG: 25 TABLET, FILM COATED ORAL at 08:25

## 2021-01-31 RX ADMIN — GABAPENTIN 100 MG: 100 CAPSULE ORAL at 08:26

## 2021-01-31 RX ADMIN — HEPARIN SODIUM 5000 UNITS: 5000 INJECTION INTRAVENOUS; SUBCUTANEOUS at 05:21

## 2021-01-31 RX ADMIN — ASPIRIN 81 MG: 81 TABLET, CHEWABLE ORAL at 08:26

## 2021-01-31 RX ADMIN — LISINOPRIL 5 MG: 5 TABLET ORAL at 08:26

## 2021-01-31 RX ADMIN — HEPARIN SODIUM 5000 UNITS: 5000 INJECTION INTRAVENOUS; SUBCUTANEOUS at 13:35

## 2021-01-31 RX ADMIN — METOPROLOL TARTRATE 25 MG: 25 TABLET, FILM COATED ORAL at 21:23

## 2021-01-31 RX ADMIN — HEPARIN SODIUM 5000 UNITS: 5000 INJECTION INTRAVENOUS; SUBCUTANEOUS at 21:23

## 2021-01-31 NOTE — ASSESSMENT & PLAN NOTE
Cr 1 46 today   Baseline 1 1   Hold ACE inhibitor   Avoid hypotension and nephrotoxins   Check BMP in AM

## 2021-01-31 NOTE — PLAN OF CARE
Problem: Potential for Falls  Goal: Patient will remain free of falls  Description: INTERVENTIONS:  - Assess patient frequently for physical needs  -  Identify cognitive and physical deficits and behaviors that affect risk of falls  -  [place] fall precautions as indicated by assessment   - Educate patient/family on patient safety including physical limitations  - Instruct patient to call for assistance with activity based on assessment  - Modify environment to reduce risk of injury  - Consider OT/PT consult to assist with strengthening/mobility  Outcome: Progressing     Problem: PAIN - ADULT  Goal: Verbalizes/displays adequate comfort level or baseline comfort level  Description: Interventions:  - Encourage patient to monitor pain and request assistance  - Assess pain using appropriate pain scale  - Administer analgesics based on type and severity of pain and evaluate response  - Implement non-pharmacological measures as appropriate and evaluate response  - Consider cultural and social influences on pain and pain management  - Notify physician/advanced practitioner if interventions unsuccessful or patient reports new pain  Outcome: Progressing     Problem: SAFETY ADULT  Goal: Patient will remain free of falls  Description: INTERVENTIONS:  - Assess patient frequently for physical needs  -  Identify cognitive and physical deficits and behaviors that affect risk of falls    -  [place] fall precautions as indicated by assessment   - Educate patient/family on patient safety including physical limitations  - Instruct patient to call for assistance with activity based on assessment  - Modify environment to reduce risk of injury  - Consider OT/PT consult to assist with strengthening/mobility  Outcome: Progressing  Goal: Maintain or return to baseline ADL function  Description: INTERVENTIONS:  -  Assess patient's ability to carry out ADLs; assess patient's baseline for ADL function and identify physical deficits which impact ability to perform ADLs (bathing, care of mouth/teeth, toileting, grooming, dressing, etc )  - Assess/evaluate cause of self-care deficits   - Assess range of motion  - Assess patient's mobility; develop plan if impaired  - Assess patient's need for assistive devices and provide as appropriate  - Encourage maximum independence but intervene and supervise when necessary  - Involve family in performance of ADLs  - Assess for home care needs following discharge   - Consider OT consult to assist with ADL evaluation and planning for discharge  - Provide patient education as appropriate  Outcome: Progressing  Goal: Maintain or return mobility status to optimal level  Description: INTERVENTIONS:  - Assess patient's baseline mobility status (ambulation, transfers, stairs, etc )    - Identify cognitive and physical deficits and behaviors that affect mobility  - Identify mobility aids required to assist with transfers and/or ambulation (gait belt, sit-to-stand, lift, walker, cane, etc )  - [place] fall precautions as indicated by assessment  - Record patient progress and toleration of activity level on Mobility SBAR; progress patient to next Phase/Stage  - Instruct patient to call for assistance with activity based on assessment  - Consider rehabilitation consult to assist with strengthening/weightbearing, etc   Outcome: Progressing     Problem: DISCHARGE PLANNING  Goal: Discharge to home or other facility with appropriate resources  Description: INTERVENTIONS:  - Identify barriers to discharge w/patient and caregiver  - Arrange for needed discharge resources and transportation as appropriate  - Identify discharge learning needs (meds, wound care, etc )  - Arrange for interpretive services to assist at discharge as needed  - Refer to Case Management Department for coordinating discharge planning if the patient needs post-hospital services based on physician/advanced practitioner order or complex needs related to functional status, cognitive ability, or social support system  Outcome: Progressing     Problem: Knowledge Deficit  Goal: Patient/family/caregiver demonstrates understanding of disease process, treatment plan, medications, and discharge instructions  Description: Complete learning assessment and assess knowledge base    Interventions:  - Provide teaching at level of understanding  - Provide teaching via preferred learning methods  Outcome: Progressing     Problem: CARDIOVASCULAR - ADULT  Goal: Maintains optimal cardiac output and hemodynamic stability  Description: INTERVENTIONS:  - Monitor I/O, vital signs and rhythm  - Monitor for S/S and trends of decreased cardiac output  - Administer and titrate ordered vasoactive medications to optimize hemodynamic stability  - Assess quality of pulses, skin color and temperature  - Assess for signs of decreased coronary artery perfusion  - Instruct patient to report change in severity of symptoms  Outcome: Progressing  Goal: Absence of cardiac dysrhythmias or at baseline rhythm  Description: INTERVENTIONS:  - Continuous cardiac monitoring, vital signs, obtain 12 lead EKG if ordered  - Administer antiarrhythmic and heart rate control medications as ordered  - Monitor electrolytes and administer replacement therapy as ordered  Outcome: Progressing

## 2021-01-31 NOTE — ASSESSMENT & PLAN NOTE
Patient has waxing waning exertional chest pain for several days PTA   Blood pressure 179/90 on admission now resolved and stable   Troponin negative x 3   Also with symptoms that appear to be related to PACs with symptoms of "flutter" in chest   Started lopressor 25 mg bid   Stop HCTZ and hold lisinopril this morning due to rise in Cr   Plan for stress test Monday   LDL 96   A1c 5 9%

## 2021-01-31 NOTE — PROGRESS NOTES
Progress Note - Dennie Poland [birth year], 67 y o  female MRN: [id number]  Unit/Bed#: E4 -01 Encounter: [id number]  Primary Care Provider: Princess Valenzuela DO   Date and time admitted to hospital: 1/29/2021  9:30 PM    * Chest pain  Assessment & Plan  Patient has waxing waning exertional chest pain for several days PTA   Blood pressure 179/90 on admission now resolved and stable   Troponin negative x 3   Also with symptoms that appear to be related to PACs with symptoms of "flutter" in chest   Started lopressor 25 mg bid   Stop HCTZ and hold lisinopril this morning due to rise in Cr   Plan for stress test Monday   LDL 96   A1c 5 9%     Accelerated hypertension  Assessment & Plan  /90 on admission now 's   Stop HCTZ   Started lopressor 25 mg bid   Hold lisinopril today in evidence of rising Cr   Monitor VS trend       Controlled type 2 diabetes mellitus, without long-term current use of insulin (HCC)  Assessment & Plan  Lab Results   Component Value Date    HGBA1C 5 9 (H) 01/30/2021       Recent Labs     01/29/21  2140 01/30/21  0802   POCGLU 115 102       Blood Sugar Average: Last 72 hrs:  (P) 108 5   On glipizide with excellent A1c control, given advanced age would stop this upon discharge to prevent hypoglycemia   SSI while here - has not required coverage     PAC (premature atrial contraction)  Assessment & Plan  PACs noted   Started on lopressor     Elevated serum creatinine  Assessment & Plan  Cr 1 46 today   Baseline 1 1   Hold ACE inhibitor   Avoid hypotension and nephrotoxins   Check BMP in AM       VTE Pharmacologic Prophylaxis:   Pharmacologic: Heparin  Mechanical VTE Prophylaxis in Place: Yes    Patient Centered Rounds: I have performed bedside rounds with nursing staff today      Discussions with Specialists or Other Care Team Provider:     Education and Discussions with Family / Patient: patient at the bedside, spoke with her son Demetris Phillips per patient request and reviewed plan of care and answered all questions     Time Spent for Care: 20 minutes  More than 50% of total time spent on counseling and coordination of care as described above  Current Length of Stay: 2 day(s)    Current Patient Status: Inpatient   Certification Statement: The patient will continue to require additional inpatient hospital stay due to chest pain     Discharge Plan: for stress test Monday     Code Status: Level 1 - Full Code      Subjective:   Patient doing well  Heart palpitation simproved significantly from yesterday  No further chest pain  No other acute complaints or events overnight  She is understanding of stress test diet  Objective:     Vitals:   Temp (24hrs), Av 9 °F (36 6 °C), Min:97 3 °F (36 3 °C), Max:98 6 °F (37 °C)    Temp:  [97 3 °F (36 3 °C)-98 6 °F (37 °C)] 97 9 °F (36 6 °C)  HR:  [59-90] 63  Resp:  [18] 18  BP: (118-141)/(66-77) 132/70  SpO2:  [95 %-96 %] 95 %  Body mass index is 28 24 kg/m²  Input and Output Summary (last 24 hours):     No intake or output data in the 24 hours ending 21 0840    Physical Exam:     Physical Exam  Vitals signs and nursing note reviewed  Constitutional:       General: She is not in acute distress  HENT:      Head: Normocephalic  Eyes:      Conjunctiva/sclera: Conjunctivae normal    Cardiovascular:      Rate and Rhythm: Normal rate and regular rhythm  Comments: occasional PACs but improved in frequency from yesterday   Pulmonary:      Effort: Pulmonary effort is normal  No respiratory distress  Breath sounds: Normal breath sounds  Abdominal:      General: Bowel sounds are normal       Palpations: Abdomen is soft  Skin:     General: Skin is warm  Neurological:      Mental Status: She is alert and oriented to person, place, and time     Psychiatric:         Mood and Affect: Mood normal          Additional Data:     Labs:    Results from last 7 days   Lab Units 21  0540   WBC Thousand/uL 5 70   HEMOGLOBIN g/dL 13 0   HEMATOCRIT % 37 9   PLATELETS Thousands/uL 216   NEUTROS PCT % 58   LYMPHS PCT % 28   MONOS PCT % 10   EOS PCT % 3     Results from last 7 days   Lab Units 01/31/21  0540   SODIUM mmol/L 137   POTASSIUM mmol/L 3 9   CHLORIDE mmol/L 100   CO2 mmol/L 30   BUN mg/dL 30*   CREATININE mg/dL 1 46*   ANION GAP mmol/L 7   CALCIUM mg/dL 9 3   GLUCOSE RANDOM mg/dL 113         Results from last 7 days   Lab Units 01/30/21  0802 01/29/21  2140   POC GLUCOSE mg/dl 102 115     Results from last 7 days   Lab Units 01/30/21  0217   HEMOGLOBIN A1C % 5 9*               * I Have Reviewed All Lab Data Listed Above  * Additional Pertinent Lab Tests Reviewed: All Labs Within Last 24 Hours Reviewed    Imaging:    Imaging Reports Reviewed Today Include: reviewed  Imaging Personally Reviewed by Myself Includes:      Recent Cultures (last 7 days):           Last 24 Hours Medication List:   Current Facility-Administered Medications   Medication Dose Route Frequency Provider Last Rate    acetaminophen  650 mg Oral Q6H PRN Taisha Chatman PA-C      aspirin  81 mg Oral Daily Taisha Chatman PA-C      gabapentin  100 mg Oral Daily Taisha Chatman PA-C      heparin (porcine)  5,000 Units Subcutaneous Q8H 3 [facility] FRANCES Chatman PA-C      Labetalol HCl  10 mg Intravenous Q4H PRN Taisha Chatman PA-C      metoprolol tartrate  25 mg Oral Q12H [address] Melly Nickerson PA-C      morphine injection  2 mg Intravenous Q4H PRN Taisha Chatman PA-C      ondansetron  4 mg Intravenous Q6H PRN Taisha Meraz PA-C          Today, Patient Was Seen By: Dandre Perkins PA-C    ** Please Note: Dictation voice to text software may have been used in the creation of this document   **

## 2021-01-31 NOTE — PROGRESS NOTES
Progress Note - Electrophysiology-Cardiology (EP)   Gilberto Rolle 67 y o  female MRN: [id number]  Unit/Bed#: E4 -01 Encounter: [id number]    Assessment and Plan:    #Chest pain  · Atypical in nature  · Had similar pain two years ago with negative exercise stress test  · BP usually normal in 120-130s but was elevated to 200  · Discomfort could be due to HTN  · ECG without ischemic changes  · Troponin negative here  · Recommend pharmacologic stress test Monday  · Started lopressor 25 mg bid for PACs  · Feels better  · Tele with sinus rhythm and PAC  · NPO after midnight    #History of cardiac arrest  · 40 years ago post-op from hysterectomy   · No cardiac work-up at the time  · No history of her arrest  · No need for ICD at this time    #Accelerated hypertension  · HTN emergency   · Given home ACE-I and HCTZ  · Trial NTG-Sl  · On IV labetolol prn for elevated BP   · Continue to control BP with goal SBP < 140    #Controlled type 2 diabetes mellitus, without long-term current use of insulin (HCC)  · Last A1c 5 9         Subjective/Objective   Feels better  Chest discomfort is less  Denies dizziness, shortness of breath, orthopnea          Review of Systems:     Review of Systems - History obtained from the patient  General ROS: negative for - fatigue, fever or weight loss  Psychological ROS: negative for - anxiety or disorientation  Ophthalmic ROS: negative for - blurry vision or double vision  ENT ROS: negative for - epistaxis  Allergy and Immunology ROS: negative for - hives  Hematological and Lymphatic ROS: negative for - bleeding problems or bruising  Endocrine ROS: negative for - palpitations  Respiratory ROS: no cough, shortness of breath, or wheezing  Cardiovascular ROS: negative for - dyspnea on exertion, palpitations, shortness of breath, edema, loss of consciousness, orthopnea or paroxysmal nocturnal dyspnea  Gastrointestinal ROS: no abdominal pain, change in bowel habits, or black or bloody stools  Genito-Urinary ROS: no dysuria, trouble voiding, or hematuria  Musculoskeletal ROS: negative for - joint pain  Neurological ROS: no TIA or stroke symptoms  Dermatological ROS: negative for rash      Vitals:    01/29/21 2127   Weight: 74 6 kg (164 lb 8 oz)       Orthostatic Blood Pressures      Most Recent Value   Blood Pressure  130/73 filed at 01/31/2021 1100   Patient Position - Orthostatic VS  Lying filed at 01/31/2021 1100            Intake/Output Summary (Last 24 hours) at 1/31/2021 1307  Last data filed at 1/31/2021 9334  Gross per 24 hour   Intake 120 ml   Output --   Net 120 ml       Invasive Devices     Peripheral Intravenous Line            Peripheral IV 01/29/21 Left Antecubital 1 day                            Objective:   Vitals: /73 (BP Location: Left arm)   Pulse (!) 54   Temp 98 6 °F (37 °C) (Temporal)   Resp 18   Ht 5' 4" (1 626 m)   Wt 74 6 kg (164 lb 8 oz)   SpO2 93%   BMI 28 24 kg/m²   Physical Exam:  GEN: NAD, Alert and oriented, well appearing  HEENT:Head, neck, ears, oral pharynx: Mucus membranes moist, oral pharynx clear, nares clear  External ears normal  EYES: Pupils equal, sclera anicteric  NECK: No JVD  CARDIOVASCULAR: RRR, No murmur, rub, gallops S1,S2  LUNGS: Clear To auscultation bilaterally  ABDOMEN: Soft, nondistended  EXTREMITIES/VASCULAR: No edema    PSYCH: Normal Affect  NEURO: Grossly intact, moving all extremiteis equal, face symetric  HEME: No bleeding, bruising, petechia  SKIN: No significant rashes     Medications:   Medication Administration - last 24 hours from 01/30/2021 1307 to 01/31/2021 1307       Date/Time Order Dose Route Action Action by     01/31/2021 0826 aspirin chewable tablet 81 mg 81 mg Oral Given Angelika Lorenzo RN     01/31/2021 0826 gabapentin (NEURONTIN) capsule 100 mg 100 mg Oral Given Angelika Lorenzo RN     01/31/2021 0521 heparin (porcine) subcutaneous injection 5,000 Units 5,000 Units Subcutaneous Given Farheen King RN     01/30/2021 2123 heparin (porcine) subcutaneous injection 5,000 Units 5,000 Units Subcutaneous Given Reynaldo Lang RN     01/30/2021 1406 heparin (porcine) subcutaneous injection 5,000 Units 5,000 Units Subcutaneous Given Susy Camarena RN     01/31/2021 0826 lisinopril (ZESTRIL) tablet 5 mg 5 mg Oral Given Susy Camarena RN     01/30/2021 2122 lisinopril (ZESTRIL) tablet 5 mg 5 mg Oral Given Reynaldo Lang RN     01/31/2021 0825 metoprolol tartrate (LOPRESSOR) tablet 25 mg 25 mg Oral Given Susy Camarena RN     01/30/2021 2122 metoprolol tartrate (LOPRESSOR) tablet 25 mg 25 mg Oral Given Reynaldo Lang RN     01/30/2021 1406 metoprolol tartrate (LOPRESSOR) tablet 25 mg 25 mg Oral Given Susy Camarena RN            Current Facility-Administered Medications:     acetaminophen (TYLENOL) tablet 650 mg, 650 mg, Oral, Q6H PRN, Taisha Chatman PA-C    aspirin chewable tablet 81 mg, 81 mg, Oral, Daily, Taisha Chatman PA-C, 81 mg at 01/31/21 9691    gabapentin (NEURONTIN) capsule 100 mg, 100 mg, Oral, Daily, Taisha Chatman PA-C, 100 mg at 01/31/21 6046    heparin (porcine) subcutaneous injection 5,000 Units, 5,000 Units, Subcutaneous, Q8H [address], 5,000 Units at 01/31/21 0521 **AND** Platelet count, , , Once, Taisha Chatman PA-C    Labetalol HCl (NORMODYNE) injection 10 mg, 10 mg, Intravenous, Q4H PRN, Taisha Chatman PA-C    metoprolol tartrate (LOPRESSOR) tablet 25 mg, 25 mg, Oral, Q12H [address], Melly Nickerson PA-C, 25 mg at 01/31/21 0825    morphine injection 2 mg, 2 mg, Intravenous, Q4H PRN, Taisha Chatman PA-C    ondansetron (ZOFRAN) injection 4 mg, 4 mg, Intravenous, Q6H PRN, Larisa Olivas PA-C    Lab Results:   CBC with diff:   Lab Results   Component Value Date    WBC 5 70 01/31/2021    HGB 13 0 01/31/2021    HCT 37 9 01/31/2021    MCV 89 01/31/2021     01/31/2021    MCH 30 7 01/31/2021    MCHC 34 3 01/31/2021    RDW 11 9 01/31/2021    MPV 9 9 01/31/2021    NRBC 0 01/31/2021       CMP:  Lab Results   Component Value Date    K 3 9 01/31/2021     01/31/2021    CO2 30 01/31/2021    BUN 30 (H) 01/31/2021    CREATININE 1 46 (H) 01/31/2021    CALCIUM 9 3 01/31/2021    AST 21 09/18/2018    ALT 14 09/18/2018    ALKPHOS 64 09/18/2018    EGFR 35 01/31/2021       BMP:  Results from last 7 days   Lab Units 01/31/21  0540 01/29/21  1624   POTASSIUM mmol/L 3 9 3 5   CHLORIDE mmol/L 100 98   CO2 mmol/L 30 29   BUN mg/dL 30* 24   CREATININE mg/dL 1 46* 1 19   CALCIUM mg/dL 9 3 9 3       Magnesium: No results found for: MG    CPK:       Troponin:   Lab Results   Component Value Date    TROPONINI 0 02 01/30/2021       BNP: No results found for: BNP    Coags:   Lab Results   Component Value Date    PTT 32 09/18/2018    INR 1 19 09/18/2018       TSH: No results found for: TSH    Lipid Profile: No results found for: LABLIPI      Imaging: I have personally reviewed pertinent reports  X-ray Chest 1 View Portable    Result Date: 1/29/2021  Narrative: CHEST INDICATION:   chest pain  COMPARISON:  9/18/2018 EXAM PERFORMED/VIEWS:  XR CHEST PORTABLE FINDINGS: Cardiomediastinal silhouette appears unremarkable  The lungs are clear  No pneumothorax or pleural effusion  Osseous structures appear within normal limits for patient age  Impression: No acute cardiopulmonary disease  Workstation performed: [id number]         Tele - sinus rhythm with PAC, NSVT           Counseling / Coordination of Erwin Jaquez MD

## 2021-01-31 NOTE — ASSESSMENT & PLAN NOTE
/90 on admission now 's   Stop HCTZ   Started lopressor 25 mg bid   Hold lisinopril today in evidence of rising Cr   Monitor VS trend

## 2021-01-31 NOTE — PLAN OF CARE
Problem: Potential for Falls  Goal: Patient will remain free of falls  Description: INTERVENTIONS:  - Assess patient frequently for physical needs  -  Identify cognitive and physical deficits and behaviors that affect risk of falls  -  [place] fall precautions as indicated by assessment   - Educate patient/family on patient safety including physical limitations  - Instruct patient to call for assistance with activity based on assessment  - Modify environment to reduce risk of injury  - Consider OT/PT consult to assist with strengthening/mobility  Outcome: Progressing     Problem: PAIN - ADULT  Goal: Verbalizes/displays adequate comfort level or baseline comfort level  Description: Interventions:  - Encourage patient to monitor pain and request assistance  - Assess pain using appropriate pain scale  - Administer analgesics based on type and severity of pain and evaluate response  - Implement non-pharmacological measures as appropriate and evaluate response  - Consider cultural and social influences on pain and pain management  - Notify physician/advanced practitioner if interventions unsuccessful or patient reports new pain  Outcome: Progressing     Problem: SAFETY ADULT  Goal: Patient will remain free of falls  Description: INTERVENTIONS:  - Assess patient frequently for physical needs  -  Identify cognitive and physical deficits and behaviors that affect risk of falls    -  [place] fall precautions as indicated by assessment   - Educate patient/family on patient safety including physical limitations  - Instruct patient to call for assistance with activity based on assessment  - Modify environment to reduce risk of injury  - Consider OT/PT consult to assist with strengthening/mobility  Outcome: Progressing  Goal: Maintain or return to baseline ADL function  Description: INTERVENTIONS:  -  Assess patient's ability to carry out ADLs; assess patient's baseline for ADL function and identify physical deficits which impact ability to perform ADLs (bathing, care of mouth/teeth, toileting, grooming, dressing, etc )  - Assess/evaluate cause of self-care deficits   - Assess range of motion  - Assess patient's mobility; develop plan if impaired  - Assess patient's need for assistive devices and provide as appropriate  - Encourage maximum independence but intervene and supervise when necessary  - Involve family in performance of ADLs  - Assess for home care needs following discharge   - Consider OT consult to assist with ADL evaluation and planning for discharge  - Provide patient education as appropriate  Outcome: Progressing  Goal: Maintain or return mobility status to optimal level  Description: INTERVENTIONS:  - Assess patient's baseline mobility status (ambulation, transfers, stairs, etc )    - Identify cognitive and physical deficits and behaviors that affect mobility  - Identify mobility aids required to assist with transfers and/or ambulation (gait belt, sit-to-stand, lift, walker, cane, etc )  - [place] fall precautions as indicated by assessment  - Record patient progress and toleration of activity level on Mobility SBAR; progress patient to next Phase/Stage  - Instruct patient to call for assistance with activity based on assessment  - Consider rehabilitation consult to assist with strengthening/weightbearing, etc   Outcome: Progressing     Problem: DISCHARGE PLANNING  Goal: Discharge to home or other facility with appropriate resources  Description: INTERVENTIONS:  - Identify barriers to discharge w/patient and caregiver  - Arrange for needed discharge resources and transportation as appropriate  - Identify discharge learning needs (meds, wound care, etc )  - Arrange for interpretive services to assist at discharge as needed  - Refer to Case Management Department for coordinating discharge planning if the patient needs post-hospital services based on physician/advanced practitioner order or complex needs related to functional status, cognitive ability, or social support system  Outcome: Progressing     Problem: CARDIOVASCULAR - ADULT  Goal: Maintains optimal cardiac output and hemodynamic stability  Description: INTERVENTIONS:  - Monitor I/O, vital signs and rhythm  - Monitor for S/S and trends of decreased cardiac output  - Administer and titrate ordered vasoactive medications to optimize hemodynamic stability  - Assess quality of pulses, skin color and temperature  - Assess for signs of decreased coronary artery perfusion  - Instruct patient to report change in severity of symptoms  Outcome: Progressing  Goal: Absence of cardiac dysrhythmias or at baseline rhythm  Description: INTERVENTIONS:  - Continuous cardiac monitoring, vital signs, obtain 12 lead EKG if ordered  - Administer antiarrhythmic and heart rate control medications as ordered  - Monitor electrolytes and administer replacement therapy as ordered  Outcome: Progressing

## 2021-02-01 ENCOUNTER — APPOINTMENT (INPATIENT)
Dept: NUCLEAR MEDICINE | Facility: HOSPITAL | Age: 76
DRG: 309 | End: 2021-02-01
Payer: MEDICARE

## 2021-02-01 ENCOUNTER — APPOINTMENT (INPATIENT)
Dept: NON INVASIVE DIAGNOSTICS | Facility: HOSPITAL | Age: 76
DRG: 309 | End: 2021-02-01
Payer: MEDICARE

## 2021-02-01 PROBLEM — I25.2 HISTORY OF MI (MYOCARDIAL INFARCTION): Status: ACTIVE | Noted: 2021-01-29

## 2021-02-01 PROBLEM — N17.9 AKI (ACUTE KIDNEY INJURY) (HCC): Status: ACTIVE | Noted: 2021-01-31

## 2021-02-01 LAB
ANION GAP SERPL CALCULATED.3IONS-SCNC: 6 MMOL/L (ref 4–13)
BILIRUB UR QL STRIP: NEGATIVE
BUN SERPL-MCNC: 33 MG/DL (ref 5–25)
CALCIUM SERPL-MCNC: 9 MG/DL (ref 8.3–10.1)
CHLORIDE SERPL-SCNC: 102 MMOL/L (ref 100–108)
CLARITY UR: CLEAR
CO2 SERPL-SCNC: 30 MMOL/L (ref 21–32)
COLOR UR: NORMAL
CREAT SERPL-MCNC: 1.42 MG/DL (ref 0.6–1.3)
GFR SERPL CREATININE-BSD FRML MDRD: 36 ML/MIN/1.73SQ M
GLUCOSE SERPL-MCNC: 95 MG/DL (ref 65–140)
GLUCOSE UR STRIP-MCNC: NEGATIVE MG/DL
HGB UR QL STRIP.AUTO: NEGATIVE
KETONES UR STRIP-MCNC: NEGATIVE MG/DL
LEUKOCYTE ESTERASE UR QL STRIP: NEGATIVE
NITRITE UR QL STRIP: NEGATIVE
PH UR STRIP.AUTO: 6 [PH]
POTASSIUM SERPL-SCNC: 3.7 MMOL/L (ref 3.5–5.3)
PROT UR STRIP-MCNC: NEGATIVE MG/DL
SODIUM SERPL-SCNC: 138 MMOL/L (ref 136–145)
SP GR UR STRIP.AUTO: 1.01 (ref 1–1.03)
UROBILINOGEN UR QL STRIP.AUTO: 0.2 E.U./DL

## 2021-02-01 PROCEDURE — A9502 TC99M TETROFOSMIN: HCPCS

## 2021-02-01 PROCEDURE — 93018 CV STRESS TEST I&R ONLY: CPT | Performed by: INTERNAL MEDICINE

## 2021-02-01 PROCEDURE — G1004 CDSM NDSC: HCPCS

## 2021-02-01 PROCEDURE — 80048 BASIC METABOLIC PNL TOTAL CA: CPT | Performed by: PHYSICIAN ASSISTANT

## 2021-02-01 PROCEDURE — 78452 HT MUSCLE IMAGE SPECT MULT: CPT

## 2021-02-01 PROCEDURE — 78452 HT MUSCLE IMAGE SPECT MULT: CPT | Performed by: INTERNAL MEDICINE

## 2021-02-01 PROCEDURE — 99232 SBSQ HOSP IP/OBS MODERATE 35: CPT | Performed by: PHYSICIAN ASSISTANT

## 2021-02-01 PROCEDURE — 93016 CV STRESS TEST SUPVJ ONLY: CPT | Performed by: INTERNAL MEDICINE

## 2021-02-01 PROCEDURE — 81003 URINALYSIS AUTO W/O SCOPE: CPT | Performed by: PHYSICIAN ASSISTANT

## 2021-02-01 PROCEDURE — 93017 CV STRESS TEST TRACING ONLY: CPT

## 2021-02-01 RX ORDER — SODIUM CHLORIDE 9 MG/ML
75 INJECTION, SOLUTION INTRAVENOUS CONTINUOUS
Status: DISPENSED | OUTPATIENT
Start: 2021-02-01 | End: 2021-02-02

## 2021-02-01 RX ORDER — SODIUM CHLORIDE 9 MG/ML
75 INJECTION, SOLUTION INTRAVENOUS CONTINUOUS
Status: DISCONTINUED | OUTPATIENT
Start: 2021-02-01 | End: 2021-02-01

## 2021-02-01 RX ADMIN — REGADENOSON 0.4 MG: 0.08 INJECTION, SOLUTION INTRAVENOUS at 09:17

## 2021-02-01 RX ADMIN — ASPIRIN 81 MG: 81 TABLET, CHEWABLE ORAL at 10:28

## 2021-02-01 RX ADMIN — HEPARIN SODIUM 5000 UNITS: 5000 INJECTION INTRAVENOUS; SUBCUTANEOUS at 14:42

## 2021-02-01 RX ADMIN — HEPARIN SODIUM 5000 UNITS: 5000 INJECTION INTRAVENOUS; SUBCUTANEOUS at 21:30

## 2021-02-01 RX ADMIN — SODIUM CHLORIDE 75 ML/HR: 0.9 INJECTION, SOLUTION INTRAVENOUS at 14:47

## 2021-02-01 RX ADMIN — HEPARIN SODIUM 5000 UNITS: 5000 INJECTION INTRAVENOUS; SUBCUTANEOUS at 05:34

## 2021-02-01 RX ADMIN — METOPROLOL TARTRATE 25 MG: 25 TABLET, FILM COATED ORAL at 10:29

## 2021-02-01 RX ADMIN — GABAPENTIN 100 MG: 100 CAPSULE ORAL at 10:28

## 2021-02-01 RX ADMIN — METOPROLOL TARTRATE 25 MG: 25 TABLET, FILM COATED ORAL at 21:30

## 2021-02-01 NOTE — ASSESSMENT & PLAN NOTE
Lab Results   Component Value Date    HGBA1C 5 9 (H) 01/30/2021     Recent Labs     01/29/21  2140 01/30/21  0802   POCGLU 115 102   On glipizide with excellent A1c control  Given advanced age would stop this upon discharge to prevent hypoglycemia   SSI while here - has not required coverage

## 2021-02-01 NOTE — PROGRESS NOTES
Progress Note - Ira Queen [birth year], 67 y o  female MRN: [id number]    Unit/Bed#: E4 -01 Encounter: [id number]    Primary Care Provider: Maame Wu DO   Date and time admitted to hospital: 1/29/2021  9:30 PM        * Chest pain  Assessment & Plan  Patient has waxing waning exertional chest pain for several days PTA   Blood pressure 179/90 on admission now resolved and stable   Troponin negative x 3   Also with symptoms that appear to be related to PACs with symptoms of "flutter" in chest   Started lopressor 25 mg bid   Stop HCTZ and lisinopril on hold due to rise in Cr   Underwent stress test today Monday 2/1/21- report pending  Cholesterol 154, triglycerides 42, HDL 49, LDL 96   A1c 5 9% - well controlled    Elevated serum creatinine  Assessment & Plan  Results from last 7 days   Lab Units 02/01/21  0527 01/31/21  0540 01/29/21  1624   CREATININE mg/dL 1 42* 1 46* 1 19   Baseline appears to be around 1 1   Prehospital, pt was on HCTZ and lisinopril- these were held 1/31  Cr without much improvement overnight   Avoid hypotension and nephrotoxins   Check bladder scan and UA  Will give low IV fluids as BUN/Cr ration is >20  Recheck Cr in AM     PAC (premature atrial contraction)  Assessment & Plan  PACs noted   Started on lopressor    Controlled type 2 diabetes mellitus, without long-term current use of insulin (HCC)  Assessment & Plan  Lab Results   Component Value Date    HGBA1C 5 9 (H) 01/30/2021     Recent Labs     01/29/21  2140 01/30/21  0802   POCGLU 115 102   On glipizide with excellent A1c control  Given advanced age would stop this upon discharge to prevent hypoglycemia   SSI while here - has not required coverage     Accelerated hypertension  Assessment & Plan  /90 on admission now 's   Stop HCTZ   Started lopressor 25 mg bid   Hold lisinopril today in evidence of rising Cr   Monitor VS trend     History of MI (myocardial infarction)  Assessment & Plan  Patient reports history of heart attack 40 years ago postoperatively from hysterectomy  She denies any subsequent is stents or bypass, raising concern for [place] cardiomyopathy  She had stress test in 2018 which was negative for ischemia and does not follow with a cardiologist   She does not recall any cardiac catheterization      VTE Pharmacologic Prophylaxis:   Pharmacologic: Heparin  Mechanical VTE Prophylaxis in Place: Yes    Discharge Plan: Hopeful d c home tomorrow if Cr improves and pending stress test results    Discussions with Specialists or Other Care Team Provider: nursing    Education and Discussions with Family / Patient: patient    Time Spent for Care: 20 minutes  More than 50% of total time spent on counseling and coordination of care as described above  Current Length of Stay: 3 day(s)  Current Patient Status: Inpatient   Code Status: Level 1 - Full Code    Subjective:   Resting in bed  Had stress test performed this am  Feels okay  Awaiting results of testing  Discussed med changes  Objective:     Vitals:   Temp (24hrs), Av 6 °F (37 °C), Min:98 3 °F (36 8 °C), Max:98 9 °F (37 2 °C)    Temp:  [98 3 °F (36 8 °C)-98 9 °F (37 2 °C)] 98 3 °F (36 8 °C)  HR:  [55-68] 67  Resp:  [16-19] 16  BP: (125-141)/(68-80) 139/80  SpO2:  [93 %-97 %] 95 %  Body mass index is 28 24 kg/m²  Input and Output Summary (last 24 hours):     No intake or output data in the 24 hours ending 21 1405    Physical Exam:     Physical Exam  Vitals signs and nursing note reviewed  Constitutional:       General: She is not in acute distress  Appearance: Normal appearance  She is normal weight  She is not ill-appearing, toxic-appearing or diaphoretic  HENT:      Head: Normocephalic and atraumatic  Eyes:      General: No scleral icterus  Cardiovascular:      Rate and Rhythm: Normal rate and regular rhythm  Pulmonary:      Effort: Pulmonary effort is normal  No respiratory distress        Breath sounds: Normal breath sounds  No stridor  No wheezing or rhonchi  Abdominal:      General: Bowel sounds are normal  There is no distension  Palpations: Abdomen is soft  There is no mass  Tenderness: There is no abdominal tenderness  Hernia: No hernia is present  Skin:     General: Skin is warm and dry  Neurological:      Mental Status: She is oriented to person, place, and time  Mental status is at baseline  Psychiatric:         Mood and Affect: Mood normal          Behavior: Behavior normal          Additional Data:     Labs:    Results from last 7 days   Lab Units 01/31/21  0540   WBC Thousand/uL 5 70   HEMOGLOBIN g/dL 13 0   HEMATOCRIT % 37 9   PLATELETS Thousands/uL 216   NEUTROS PCT % 58   LYMPHS PCT % 28   MONOS PCT % 10   EOS PCT % 3     Results from last 7 days   Lab Units 02/01/21  0527   POTASSIUM mmol/L 3 7   CHLORIDE mmol/L 102   CO2 mmol/L 30   BUN mg/dL 33*   CREATININE mg/dL 1 42*   CALCIUM mg/dL 9 0           * I Have Reviewed All Lab Data Listed Above  * Additional Pertinent Lab Tests Reviewed:  All Labs Within Last 24 Hours Reviewed    Imaging:    Imaging Reports Reviewed Today Include:   Imaging Personally Reviewed by Myself Includes:      Recent Cultures (last 7 days):           Last 24 Hours Medication List:   Current Facility-Administered Medications   Medication Dose Route Frequency Provider Last Rate    acetaminophen  650 mg Oral Q6H PRN Say Ruiz PA-C      aspirin  81 mg Oral Daily Taisha Chatman PA-C      gabapentin  100 mg Oral Daily Taisha Chatman PA-C      heparin (porcine)  5,000 Units Subcutaneous Q8H 3 [facility] FRANCES Chatman PA-C      Labetalol HCl  10 mg Intravenous Q4H PRN Taisha Chatman PA-C      metoprolol tartrate  25 mg Oral Q12H [facility] Melly Nickerson PA-C      morphine injection  2 mg Intravenous Q4H PRN Taisha Chatman PA-C      ondansetron  4 mg Intravenous Q6H PRN Say Ruiz PA-C          Today, Patient Was Seen By: Nguyen Desai, MEETA    ** Please Note: This note has been constructed using a voice recognition system   **

## 2021-02-01 NOTE — ASSESSMENT & PLAN NOTE
Results from last 7 days   Lab Units 02/02/21  0611 02/01/21  0527 01/31/21  0540 01/29/21  1624   CREATININE mg/dL 1 19 1 42* 1 46* 1 19   Baseline appears to be around 1 1   Stop HCTZ   Decrease lisinopril to 5 mg once daily   Avoid hypotension and nephrotoxins

## 2021-02-01 NOTE — ASSESSMENT & PLAN NOTE
Patient has waxing waning exertional chest pain for several days PTA   Blood pressure 179/90 on admission now resolved and stable   Troponin negative x 3   Also with symptoms that appear to be related to PACs with symptoms of "flutter" in chest   Started lopressor 25 mg bid   Stop HCTZ and lisinopril on hold due to rise in Cr   Underwent stress test today Monday 2/1/21- report pending  Cholesterol 154, triglycerides 42, HDL 49, LDL 96   A1c 5 9% - well controlled

## 2021-02-01 NOTE — ASSESSMENT & PLAN NOTE
Patient reports history of heart arrest 40 years ago postoperatively from hysterectomy  No history of her arrest  She denies any subsequent issues  No stents or bypass  She had stress test in 2018 which was negative for ischemia     Repeat stress test here 2/1 negative

## 2021-02-01 NOTE — ASSESSMENT & PLAN NOTE
Lab Results   Component Value Date    HGBA1C 5 9 (H) 01/30/2021     Recent Labs     01/29/21  2140 01/30/21  0802   POCGLU 115 102   On glipizide with excellent A1c control PTA   Given advanced age would stop this upon discharge to prevent hypoglycemia   Did not require SSI while here

## 2021-02-01 NOTE — ASSESSMENT & PLAN NOTE
Results from last 7 days   Lab Units 02/01/21  0527 01/31/21  0540 01/29/21  1624   CREATININE mg/dL 1 42* 1 46* 1 19   Baseline appears to be around 1 1   Prehospital, pt was on HCTZ and lisinopril- these were held 1/31  Cr without much improvement overnight   Avoid hypotension and nephrotoxins   Check bladder scan and UA  Will give small IV fluid bolus as BUN/Cr ration is >20  Recheck Cr in AM

## 2021-02-01 NOTE — PLAN OF CARE
Problem: Potential for Falls  Goal: Patient will remain free of falls  Description: INTERVENTIONS:  - Assess patient frequently for physical needs  -  Identify cognitive and physical deficits and behaviors that affect risk of falls  -  [place] fall precautions as indicated by assessment   - Educate patient/family on patient safety including physical limitations  - Instruct patient to call for assistance with activity based on assessment  - Modify environment to reduce risk of injury  - Consider OT/PT consult to assist with strengthening/mobility  Outcome: Progressing     Problem: PAIN - ADULT  Goal: Verbalizes/displays adequate comfort level or baseline comfort level  Description: Interventions:  - Encourage patient to monitor pain and request assistance  - Assess pain using appropriate pain scale  - Administer analgesics based on type and severity of pain and evaluate response  - Implement non-pharmacological measures as appropriate and evaluate response  - Consider cultural and social influences on pain and pain management  - Notify physician/advanced practitioner if interventions unsuccessful or patient reports new pain  Outcome: Progressing     Problem: SAFETY ADULT  Goal: Patient will remain free of falls  Description: INTERVENTIONS:  - Assess patient frequently for physical needs  -  Identify cognitive and physical deficits and behaviors that affect risk of falls    -  [place] fall precautions as indicated by assessment   - Educate patient/family on patient safety including physical limitations  - Instruct patient to call for assistance with activity based on assessment  - Modify environment to reduce risk of injury  - Consider OT/PT consult to assist with strengthening/mobility  Outcome: Progressing  Goal: Maintain or return to baseline ADL function  Description: INTERVENTIONS:  -  Assess patient's ability to carry out ADLs; assess patient's baseline for ADL function and identify physical deficits which impact ability to perform ADLs (bathing, care of mouth/teeth, toileting, grooming, dressing, etc )  - Assess/evaluate cause of self-care deficits   - Assess range of motion  - Assess patient's mobility; develop plan if impaired  - Assess patient's need for assistive devices and provide as appropriate  - Encourage maximum independence but intervene and supervise when necessary  - Involve family in performance of ADLs  - Assess for home care needs following discharge   - Consider OT consult to assist with ADL evaluation and planning for discharge  - Provide patient education as appropriate  Outcome: Progressing  Goal: Maintain or return mobility status to optimal level  Description: INTERVENTIONS:  - Assess patient's baseline mobility status (ambulation, transfers, stairs, etc )    - Identify cognitive and physical deficits and behaviors that affect mobility  - Identify mobility aids required to assist with transfers and/or ambulation (gait belt, sit-to-stand, lift, walker, cane, etc )  - [place] fall precautions as indicated by assessment  - Record patient progress and toleration of activity level on Mobility SBAR; progress patient to next Phase/Stage  - Instruct patient to call for assistance with activity based on assessment  - Consider rehabilitation consult to assist with strengthening/weightbearing, etc   Outcome: Progressing     Problem: DISCHARGE PLANNING  Goal: Discharge to home or other facility with appropriate resources  Description: INTERVENTIONS:  - Identify barriers to discharge w/patient and caregiver  - Arrange for needed discharge resources and transportation as appropriate  - Identify discharge learning needs (meds, wound care, etc )  - Arrange for interpretive services to assist at discharge as needed  - Refer to Case Management Department for coordinating discharge planning if the patient needs post-hospital services based on physician/advanced practitioner order or complex needs related to functional status, cognitive ability, or social support system  Outcome: Progressing     Problem: Knowledge Deficit  Goal: Patient/family/caregiver demonstrates understanding of disease process, treatment plan, medications, and discharge instructions  Description: Complete learning assessment and assess knowledge base    Interventions:  - Provide teaching at level of understanding  - Provide teaching via preferred learning methods  Outcome: Progressing     Problem: CARDIOVASCULAR - ADULT  Goal: Maintains optimal cardiac output and hemodynamic stability  Description: INTERVENTIONS:  - Monitor I/O, vital signs and rhythm  - Monitor for S/S and trends of decreased cardiac output  - Administer and titrate ordered vasoactive medications to optimize hemodynamic stability  - Assess quality of pulses, skin color and temperature  - Assess for signs of decreased coronary artery perfusion  - Instruct patient to report change in severity of symptoms  Outcome: Progressing  Goal: Absence of cardiac dysrhythmias or at baseline rhythm  Description: INTERVENTIONS:  - Continuous cardiac monitoring, vital signs, obtain 12 lead EKG if ordered  - Administer antiarrhythmic and heart rate control medications as ordered  - Monitor electrolytes and administer replacement therapy as ordered  Outcome: Progressing

## 2021-02-01 NOTE — ASSESSMENT & PLAN NOTE
PACs noted   Started on metoprolol tartrate 25 mg BID with improvement in symptoms and frequency per tele review

## 2021-02-01 NOTE — ASSESSMENT & PLAN NOTE
/90 on admission  Stop HCTZ   Started lopressor 25 mg bid   Resume lisinopril 5 mg once daily upon discharge   Blood pressure controlled now

## 2021-02-01 NOTE — ASSESSMENT & PLAN NOTE
Patient presented with waxing waning exertional chest pain for several days PTA   Blood pressure 179/90 on admission, resolved BP now normalized   Troponin negative x 3   Also with symptoms that appear to be related to PACs with symptoms of "flutter" in chest   Started lopressor 25 mg bid   Stop HCTZ   Resume lisinopril 5 mg once daily   Underwent stress test today Monday 2/1/21- Negative nuclear stress test for symptoms of angina pectoris and negative for ECG changes of ischemia     Cholesterol 154, triglycerides 42, HDL 49, LDL 96   A1c 5 9% - well controlled, stop glipizide to prevent hypoglycemia   Suspect pain secondary to elevated BP vs flutter sensation from PACs  Follow up with PCP in 1 week

## 2021-02-02 VITALS
BODY MASS INDEX: 28.09 KG/M2 | HEART RATE: 86 BPM | DIASTOLIC BLOOD PRESSURE: 75 MMHG | OXYGEN SATURATION: 94 % | WEIGHT: 164.5 LBS | HEIGHT: 64 IN | SYSTOLIC BLOOD PRESSURE: 142 MMHG | TEMPERATURE: 98.3 F | RESPIRATION RATE: 16 BRPM

## 2021-02-02 LAB
ANION GAP SERPL CALCULATED.3IONS-SCNC: 6 MMOL/L (ref 4–13)
BUN SERPL-MCNC: 22 MG/DL (ref 5–25)
CALCIUM SERPL-MCNC: 8.6 MG/DL (ref 8.3–10.1)
CHLORIDE SERPL-SCNC: 102 MMOL/L (ref 100–108)
CO2 SERPL-SCNC: 27 MMOL/L (ref 21–32)
CREAT SERPL-MCNC: 1.19 MG/DL (ref 0.6–1.3)
GFR SERPL CREATININE-BSD FRML MDRD: 44 ML/MIN/1.73SQ M
GLUCOSE SERPL-MCNC: 111 MG/DL (ref 65–140)
POTASSIUM SERPL-SCNC: 3.9 MMOL/L (ref 3.5–5.3)
SODIUM SERPL-SCNC: 135 MMOL/L (ref 136–145)

## 2021-02-02 PROCEDURE — 80048 BASIC METABOLIC PNL TOTAL CA: CPT | Performed by: PHYSICIAN ASSISTANT

## 2021-02-02 PROCEDURE — 99239 HOSP IP/OBS DSCHRG MGMT >30: CPT | Performed by: PHYSICIAN ASSISTANT

## 2021-02-02 RX ORDER — LISINOPRIL 10 MG/1
5 TABLET ORAL DAILY
Qty: 20 TABLET | Refills: 0 | Status: SHIPPED | OUTPATIENT
Start: 2021-02-02

## 2021-02-02 RX ADMIN — METOPROLOL TARTRATE 25 MG: 25 TABLET, FILM COATED ORAL at 08:22

## 2021-02-02 RX ADMIN — HEPARIN SODIUM 5000 UNITS: 5000 INJECTION INTRAVENOUS; SUBCUTANEOUS at 05:31

## 2021-02-02 RX ADMIN — GABAPENTIN 100 MG: 100 CAPSULE ORAL at 08:23

## 2021-02-02 RX ADMIN — ASPIRIN 81 MG: 81 TABLET, CHEWABLE ORAL at 08:22

## 2021-02-02 NOTE — DISCHARGE SUMMARY
Discharge- Remigio Creed [id number], 67 y o  female MRN: [id number]  Unit/Bed#: E4 -01 Encounter: [id number]  Primary Care Provider: Armando Morrison DO   Date and time admitted to hospital: 1/29/2021  9:30 PM    * Chest pain  Assessment & Plan  Patient presented with waxing waning exertional chest pain for several days PTA   Blood pressure 179/90 on admission, resolved BP now normalized   Troponin negative x 3   Also with symptoms that appear to be related to PACs with symptoms of "flutter" in chest   Started lopressor 25 mg bid   Stop HCTZ   Resume lisinopril 5 mg once daily   Underwent stress test today Monday 2/1/21- Negative nuclear stress test for symptoms of angina pectoris and negative for ECG changes of ischemia  Cholesterol 154, triglycerides 42, HDL 49, LDL 96   A1c 5 9% - well controlled, stop glipizide to prevent hypoglycemia   Suspect pain secondary to elevated BP vs flutter sensation from PACs  Follow up with PCP in 1 week     Accelerated hypertension  Assessment & Plan  /90 on admission  Stop HCTZ   Started lopressor 25 mg bid   Resume lisinopril 5 mg once daily upon discharge   Blood pressure controlled now       Controlled type 2 diabetes mellitus, without long-term current use of insulin (HCC)  Assessment & Plan  Lab Results   Component Value Date    HGBA1C 5 9 (H) 01/30/2021     Recent Labs     01/29/21  2140 01/30/21  0802   POCGLU 115 102   On glipizide with excellent A1c control PTA   Given advanced age would stop this upon discharge to prevent hypoglycemia   Did not require SSI while here     History of cardiac arrest  Assessment & Plan  Patient reports history of heart arrest 40 years ago postoperatively from hysterectomy  No history of her arrest  She denies any subsequent issues  No stents or bypass  She had stress test in 2018 which was negative for ischemia     Repeat stress test here 2/1 negative       PAC (premature atrial contraction)  Assessment & Plan  PACs noted Started on metoprolol tartrate 25 mg BID with improvement in symptoms and frequency per tele review     DEMETRIUS (acute kidney injury) [facility])  Assessment & Plan  Results from last 7 days   Lab Units 02/02/21  0611 02/01/21  0527 01/31/21  0540 01/29/21  1624   CREATININE mg/dL 1 19 1 42* 1 46* 1 19   Baseline appears to be around 1 1   Stop HCTZ   Decrease lisinopril to 5 mg once daily   Avoid hypotension and nephrotoxins           Discharging Physician / Practitioner: Meghann Palm PA-C  PCP: Rosita London DO  Admission Date:   Admission Orders (From admission, onward)     Ordered        01/29/21 2140  Inpatient Admission  Once                   Discharge Date: 02/02/21    Resolved Problems  Date Reviewed: 2/2/2021    None          Consultations During Hospital Stay:  · Cardiology     Procedures Performed:   · Nuclear stress test on 2/1/21:      INDICATIONS: Coronary artery disease      HISTORY: The patient is a 67year old  female  Chest pain status: chest pain  Other symptoms: palpitations  Coronary artery disease risk factors: hypertension and diabetes mellitus  Cardiovascular history: none significant  Medications: a beta blocker and aspirin  Acute coronary syndrome: acute myocardial infarction recently ruled out      PHYSICAL EXAM: Baseline physical exam screening: normal      REST ECG: Sinus rhythm with sinus arrhythmia and premature atrial contractions  No significant ST T-wave abnormalities  No significant chamber hypertrophy or enlargement      PROCEDURE: The study was performed in the the Stress lab  A regadenoson infusion pharmacologic stress test was performed  Gated SPECT myocardial perfusion imaging was performed after stress and at rest  Systolic blood pressure was 132  mmHg, at the start of the study  Diastolic blood pressure was 70 mmHg, at the start of the study  The heart rate was 88 bpm, at the start of the study    Regadenoson protocol:  HR bpm SBP mmHg DBP mmHg Symptoms  Baseline 88 132 70 none  1 min 121 -- -- none  2 min 116 110 70 dyspnea  3 min 120 -- -- subsiding  5 min 109 66 40 dizziness  6 min 80 108 60 subsiding  7 min 90 118 70 none  No medications or fluids given      STRESS SUMMARY: Duration of exercise was 3 min and 0 sec  Maximal work rate was 1 5 METs  Maximal heart rate during stress was 126 bpm  The rate-pressure product for the peak heart rate and blood pressure was 44571  There was no chest pain  during stress  The stress test was terminated due to protocol completion  Pre oxygen saturation: 96 %  Peak oxygen saturation: 99 %  The stress ECG was negative for ischemia and normal  No abnormal ST T wave changes were noted with  regadenoson injection and low level exercise activity  Arrhythmia during stress: intermittent isolated atrial premature beats and rare premature ventricular beat      ISOTOPE ADMINISTRATION:  Resting isotope administration Stress isotope administration  Agent Tetrofosmin Tetrofosmin  Dose 10 2 mCi 28 5 mCi  Date 02/01/2021 02/01/2021  Injection time 07:51 09:12  Imaging time 08:21 09:57  Injection-image interval 30 min 45 min     The radiopharmaceutical was injected at the peak effect of pharmacologic stress      MYOCARDIAL PERFUSION IMAGING:  The image quality was good  Rotating projection images reveal mild diaphragmatic attenuation and mild subdiaphragmatic activity  Left ventricular size was normal  The TID ratio was 1 04      PERFUSION DEFECTS:  -  There were no perfusion defects  There was physiologic apical thinning      GATED SPECT:  The calculated left ventricular ejection fraction was 59 %  Left ventricular ejection fraction was within normal limits by visual estimate  There was no diagnostic evidence for left ventricular regional abnormality      SUMMARY:  -  Stress results: Duration of exercise was 3 min and 0 sec  There was no chest pain during stress  -  ECG conclusions:  The stress ECG was negative for ischemia and normal  No abnormal ST T wave changes were noted with regadenoson injection and low level exercise activity  -  Perfusion imaging: There were no perfusion defects  There was physiologic apical thinning   -  Gated SPECT: The calculated left ventricular ejection fraction was 59 %  Left ventricular ejection fraction was within normal limits by visual estimate  There was no diagnostic evidence for left ventricular regional abnormality      IMPRESSIONS: 1  Negative regadenoson nuclear stress test for symptoms of angina pectoris and negative for ECG changes of ischemia      2  Normal myocardial perfusion scan with physiologic apical thinning  No definite evidence of reversible or fixed perfusion abnormality      3  Normal left ventricular systolic function and normal regional wall motion  Ejection fraction is determined as 59 percent      4  Normal resting blood pressure with mild hypotensive response to regadenoson injection      5  Nonspecific resting ECG abnormalities with intermittent isolated premature atrial contractions and rare isolated premature ventricular contraction noted during stress test  Myocardial perfusion imaging was normal at rest and with  stress      Prepared and signed by    Significant Findings / Test Results:   · CXR: no acute cardiopulmonary disease     Incidental Findings:   · none     Test Results Pending at Discharge (will require follow up): BMP in 2 weeks to monitor renal function       Outpatient Tests Requested:  · PCP within 1 week     Complications:  PACs, DEMETRIUS- resolved, hypertensive urgency- resolved     Reason for Admission: chest pain     Hospital Course:     Gary Ayoub is a 67 y o  female patient who originally presented to the hospital on 1/29/2021 due to chest pain intermittent for several days  She also noted "fluttering" symptoms in her chest  She was admitted for ACS rule out and seen in consultation with Cardiology  Her serial troponins were negative   She underwent nuclear stress test on 2/1/21 as above which was negative  She was noted to have PACs and suspect her symptoms are related to PACS versus hypertensive urgency on admission  She was started on lopressor 25 mg q12 hours with resolution of symptoms  Her HCTZ will be stopped and lisinopril 5 mg once daily will be continued with BB  Her blood pressure was well controlled durign her hospital stay  Of note, patient was noted to have excellent A1c control and given advanced age her glipizide will be stopped to prevent hypoglycemia  She should follow up with her PCP in 1-2 weeks and have repeat lab work to monitor renal function in 1-2 weeks  Please see above list of diagnoses and related plan for additional information  Condition at Discharge: stable     Discharge Day Visit / Exam:     Subjective:  Doing well  No chest pain  No palpitations  No other acute complaints or events overnight  Vitals: Blood Pressure: 142/75 (02/02/21 0840)  Pulse: 86 (02/02/21 0840)  Temperature: 98 3 °F (36 8 °C) (02/02/21 0840)  Temp Source: Temporal (02/02/21 0840)  Respirations: 16 (02/02/21 0840)  Height: 5' 4" (162 6 cm) (01/29/21 2127)  Weight - Scale: 74 6 kg (164 lb 8 oz) (01/29/21 2127)  SpO2: 94 % (02/02/21 0840)  Exam:   Physical Exam  Vitals signs and nursing note reviewed  Constitutional:       General: She is not in acute distress  Appearance: She is not ill-appearing  HENT:      Head: Normocephalic  Eyes:      Conjunctiva/sclera: Conjunctivae normal    Cardiovascular:      Rate and Rhythm: Normal rate and regular rhythm  Pulmonary:      Effort: Pulmonary effort is normal       Breath sounds: Normal breath sounds  Abdominal:      General: Bowel sounds are normal       Palpations: Abdomen is soft  Musculoskeletal:      Right lower leg: No edema  Left lower leg: No edema  Skin:     General: Skin is warm  Neurological:      Mental Status: She is alert and oriented to person, place, and time     Psychiatric:         Mood and Affect: Mood normal            Discharge instructions/Information to patient and family:   See after visit summary for information provided to patient and family  Provisions for Follow-Up Care:  See after visit summary for information related to follow-up care and any pertinent home health orders  Disposition:     Home    For Discharges to [facility] SNF:   · Not Applicable to this Patient - Not Applicable to this Patient    Planned Readmission: none     Discharge Statement:  I spent 34 minutes discharging the patient  This time was spent on the day of discharge  I had direct contact with the patient on the day of discharge  Greater than 50% of the total time was spent examining patient, answering all patient questions, arranging and discussing plan of care with patient as well as directly providing post-discharge instructions  Additional time then spent on discharge activities  Discharge Medications:  See after visit summary for reconciled discharge medications provided to patient and family        ** Please Note: This note has been constructed using a voice recognition system **

## 2021-02-02 NOTE — CASE MANAGEMENT
Cm reviewed chart and pt is written cm for d/c  Cm spoke to RN for any dcp needs  Cm was notified that pt has no dcp needs

## 2021-02-02 NOTE — PLAN OF CARE
Problem: Potential for Falls  Goal: Patient will remain free of falls  Description: INTERVENTIONS:  - Assess patient frequently for physical needs  -  Identify cognitive and physical deficits and behaviors that affect risk of falls  -  [place] fall precautions as indicated by assessment   - Educate patient/family on patient safety including physical limitations  - Instruct patient to call for assistance with activity based on assessment  - Modify environment to reduce risk of injury  - Consider OT/PT consult to assist with strengthening/mobility  Outcome: Adequate for Discharge     Problem: PAIN - ADULT  Goal: Verbalizes/displays adequate comfort level or baseline comfort level  Description: Interventions:  - Encourage patient to monitor pain and request assistance  - Assess pain using appropriate pain scale  - Administer analgesics based on type and severity of pain and evaluate response  - Implement non-pharmacological measures as appropriate and evaluate response  - Consider cultural and social influences on pain and pain management  - Notify physician/advanced practitioner if interventions unsuccessful or patient reports new pain  Outcome: Adequate for Discharge     Problem: SAFETY ADULT  Goal: Patient will remain free of falls  Description: INTERVENTIONS:  - Assess patient frequently for physical needs  -  Identify cognitive and physical deficits and behaviors that affect risk of falls    -  [place] fall precautions as indicated by assessment   - Educate patient/family on patient safety including physical limitations  - Instruct patient to call for assistance with activity based on assessment  - Modify environment to reduce risk of injury  - Consider OT/PT consult to assist with strengthening/mobility  Outcome: Adequate for Discharge  Goal: Maintain or return to baseline ADL function  Description: INTERVENTIONS:  -  Assess patient's ability to carry out ADLs; assess patient's baseline for ADL function and identify physical deficits which impact ability to perform ADLs (bathing, care of mouth/teeth, toileting, grooming, dressing, etc )  - Assess/evaluate cause of self-care deficits   - Assess range of motion  - Assess patient's mobility; develop plan if impaired  - Assess patient's need for assistive devices and provide as appropriate  - Encourage maximum independence but intervene and supervise when necessary  - Involve family in performance of ADLs  - Assess for home care needs following discharge   - Consider OT consult to assist with ADL evaluation and planning for discharge  - Provide patient education as appropriate  Outcome: Adequate for Discharge  Goal: Maintain or return mobility status to optimal level  Description: INTERVENTIONS:  - Assess patient's baseline mobility status (ambulation, transfers, stairs, etc )    - Identify cognitive and physical deficits and behaviors that affect mobility  - Identify mobility aids required to assist with transfers and/or ambulation (gait belt, sit-to-stand, lift, walker, cane, etc )  - [place] fall precautions as indicated by assessment  - Record patient progress and toleration of activity level on Mobility SBAR; progress patient to next Phase/Stage  - Instruct patient to call for assistance with activity based on assessment  - Consider rehabilitation consult to assist with strengthening/weightbearing, etc   Outcome: Adequate for Discharge     Problem: DISCHARGE PLANNING  Goal: Discharge to home or other facility with appropriate resources  Description: INTERVENTIONS:  - Identify barriers to discharge w/patient and caregiver  - Arrange for needed discharge resources and transportation as appropriate  - Identify discharge learning needs (meds, wound care, etc )  - Arrange for interpretive services to assist at discharge as needed  - Refer to Case Management Department for coordinating discharge planning if the patient needs post-hospital services based on physician/advanced practitioner order or complex needs related to functional status, cognitive ability, or social support system  Outcome: Adequate for Discharge     Problem: Knowledge Deficit  Goal: Patient/family/caregiver demonstrates understanding of disease process, treatment plan, medications, and discharge instructions  Description: Complete learning assessment and assess knowledge base    Interventions:  - Provide teaching at level of understanding  - Provide teaching via preferred learning methods  Outcome: Adequate for Discharge     Problem: CARDIOVASCULAR - ADULT  Goal: Maintains optimal cardiac output and hemodynamic stability  Description: INTERVENTIONS:  - Monitor I/O, vital signs and rhythm  - Monitor for S/S and trends of decreased cardiac output  - Administer and titrate ordered vasoactive medications to optimize hemodynamic stability  - Assess quality of pulses, skin color and temperature  - Assess for signs of decreased coronary artery perfusion  - Instruct patient to report change in severity of symptoms  Outcome: Adequate for Discharge  Goal: Absence of cardiac dysrhythmias or at baseline rhythm  Description: INTERVENTIONS:  - Continuous cardiac monitoring, vital signs, obtain 12 lead EKG if ordered  - Administer antiarrhythmic and heart rate control medications as ordered  - Monitor electrolytes and administer replacement therapy as ordered  Outcome: Adequate for Discharge

## 2021-02-02 NOTE — PLAN OF CARE
Problem: Potential for Falls  Goal: Patient will remain free of falls  Description: INTERVENTIONS:  - Assess patient frequently for physical needs  -  Identify cognitive and physical deficits and behaviors that affect risk of falls  -  [place] fall precautions as indicated by assessment   - Educate patient/family on patient safety including physical limitations  - Instruct patient to call for assistance with activity based on assessment  - Modify environment to reduce risk of injury  - Consider OT/PT consult to assist with strengthening/mobility  Outcome: Progressing     Problem: PAIN - ADULT  Goal: Verbalizes/displays adequate comfort level or baseline comfort level  Description: Interventions:  - Encourage patient to monitor pain and request assistance  - Assess pain using appropriate pain scale  - Administer analgesics based on type and severity of pain and evaluate response  - Implement non-pharmacological measures as appropriate and evaluate response  - Consider cultural and social influences on pain and pain management  - Notify physician/advanced practitioner if interventions unsuccessful or patient reports new pain  Outcome: Progressing     Problem: SAFETY ADULT  Goal: Patient will remain free of falls  Description: INTERVENTIONS:  - Assess patient frequently for physical needs  -  Identify cognitive and physical deficits and behaviors that affect risk of falls    -  [place] fall precautions as indicated by assessment   - Educate patient/family on patient safety including physical limitations  - Instruct patient to call for assistance with activity based on assessment  - Modify environment to reduce risk of injury  - Consider OT/PT consult to assist with strengthening/mobility  Outcome: Progressing  Goal: Maintain or return to baseline ADL function  Description: INTERVENTIONS:  -  Assess patient's ability to carry out ADLs; assess patient's baseline for ADL function and identify physical deficits which impact ability to perform ADLs (bathing, care of mouth/teeth, toileting, grooming, dressing, etc )  - Assess/evaluate cause of self-care deficits   - Assess range of motion  - Assess patient's mobility; develop plan if impaired  - Assess patient's need for assistive devices and provide as appropriate  - Encourage maximum independence but intervene and supervise when necessary  - Involve family in performance of ADLs  - Assess for home care needs following discharge   - Consider OT consult to assist with ADL evaluation and planning for discharge  - Provide patient education as appropriate  Outcome: Progressing  Goal: Maintain or return mobility status to optimal level  Description: INTERVENTIONS:  - Assess patient's baseline mobility status (ambulation, transfers, stairs, etc )    - Identify cognitive and physical deficits and behaviors that affect mobility  - Identify mobility aids required to assist with transfers and/or ambulation (gait belt, sit-to-stand, lift, walker, cane, etc )  - [place] fall precautions as indicated by assessment  - Record patient progress and toleration of activity level on Mobility SBAR; progress patient to next Phase/Stage  - Instruct patient to call for assistance with activity based on assessment  - Consider rehabilitation consult to assist with strengthening/weightbearing, etc   Outcome: Progressing     Problem: DISCHARGE PLANNING  Goal: Discharge to home or other facility with appropriate resources  Description: INTERVENTIONS:  - Identify barriers to discharge w/patient and caregiver  - Arrange for needed discharge resources and transportation as appropriate  - Identify discharge learning needs (meds, wound care, etc )  - Arrange for interpretive services to assist at discharge as needed  - Refer to Case Management Department for coordinating discharge planning if the patient needs post-hospital services based on physician/advanced practitioner order or complex needs related to functional status, cognitive ability, or social support system  Outcome: Progressing     Problem: Knowledge Deficit  Goal: Patient/family/caregiver demonstrates understanding of disease process, treatment plan, medications, and discharge instructions  Description: Complete learning assessment and assess knowledge base    Interventions:  - Provide teaching at level of understanding  - Provide teaching via preferred learning methods  Outcome: Progressing     Problem: CARDIOVASCULAR - ADULT  Goal: Maintains optimal cardiac output and hemodynamic stability  Description: INTERVENTIONS:  - Monitor I/O, vital signs and rhythm  - Monitor for S/S and trends of decreased cardiac output  - Administer and titrate ordered vasoactive medications to optimize hemodynamic stability  - Assess quality of pulses, skin color and temperature  - Assess for signs of decreased coronary artery perfusion  - Instruct patient to report change in severity of symptoms  Outcome: Progressing  Goal: Absence of cardiac dysrhythmias or at baseline rhythm  Description: INTERVENTIONS:  - Continuous cardiac monitoring, vital signs, obtain 12 lead EKG if ordered  - Administer antiarrhythmic and heart rate control medications as ordered  - Monitor electrolytes and administer replacement therapy as ordered  Outcome: Progressing

## 2021-02-02 NOTE — DISCHARGE INSTRUCTIONS
You were started on lopressor 25 mg once daily by month  Stop Hydrochlorothiazide  Decrease lisinopril 5 mg once daily in the morning  Stop glipizide for your diabetes and continue diet control measures

## 2021-02-03 ENCOUNTER — IMMUNIZATIONS (OUTPATIENT)
Dept: FAMILY MEDICINE CLINIC | Facility: HOSPITAL | Age: 76
End: 2021-02-03

## 2021-02-03 ENCOUNTER — PATIENT OUTREACH (OUTPATIENT)
Dept: CASE MANAGEMENT | Facility: OTHER | Age: 76
End: 2021-02-03

## 2021-02-03 DIAGNOSIS — Z23 ENCOUNTER FOR IMMUNIZATION: Primary | ICD-10-CM

## 2021-02-03 LAB
CHEST PAIN STATEMENT: NORMAL
MAX DIASTOLIC BP: 70 MMHG
MAX HEART RATE: 126 BPM
MAX PREDICTED HEART RATE: 144 BPM
MAX. SYSTOLIC BP: 132 MMHG
PROTOCOL NAME: NORMAL
REASON FOR TERMINATION: NORMAL
TARGET HR FORMULA: NORMAL
TIME IN EXERCISE PHASE: NORMAL

## 2021-02-03 PROCEDURE — 91301 SARS-COV-2 / COVID-19 MRNA VACCINE (MODERNA) 100 MCG: CPT

## 2021-02-03 PROCEDURE — 0011A SARS-COV-2 / COVID-19 MRNA VACCINE (MODERNA) 100 MCG: CPT

## 2021-02-03 NOTE — PROGRESS NOTES
In basket notification of hospital discharge received  Chart reviewed  I spoke with patient who is at home with her daughter  She has had no reoccurrence of chest pain/pressure  She is taking her medications as directed at discharge  She did receive her instructions and has no questions  She scheduled an appointment with her PCP  She is independent with ADL's  She has no needs at present  She took my contact information and repeated it back to me  She did consent to another call

## 2021-02-10 ENCOUNTER — EPISODE CHANGES (OUTPATIENT)
Dept: CASE MANAGEMENT | Facility: OTHER | Age: 76
End: 2021-02-10

## 2021-02-12 ENCOUNTER — PATIENT OUTREACH (OUTPATIENT)
Dept: CASE MANAGEMENT | Facility: OTHER | Age: 76
End: 2021-02-12

## 2021-02-12 NOTE — PROGRESS NOTES
I spoke with patient who is at home doing well  No report of chest pain/pressure since discharge  She has no needs at this time

## 2021-03-02 ENCOUNTER — IMMUNIZATIONS (OUTPATIENT)
Dept: FAMILY MEDICINE CLINIC | Facility: HOSPITAL | Age: 76
End: 2021-03-02

## 2021-03-02 DIAGNOSIS — Z23 ENCOUNTER FOR IMMUNIZATION: Primary | ICD-10-CM

## 2021-03-02 PROCEDURE — 0012A SARS-COV-2 / COVID-19 MRNA VACCINE (MODERNA) 100 MCG: CPT

## 2021-03-02 PROCEDURE — 91301 SARS-COV-2 / COVID-19 MRNA VACCINE (MODERNA) 100 MCG: CPT

## 2022-11-18 ENCOUNTER — HOSPITAL ENCOUNTER (OUTPATIENT)
Dept: NEUROLOGY | Facility: CLINIC | Age: 77
End: 2022-11-18

## 2022-11-18 DIAGNOSIS — R20.2 PARESTHESIA OF SKIN: ICD-10-CM

## 2025-01-23 ENCOUNTER — HOSPITAL ENCOUNTER (OUTPATIENT)
Dept: MRI IMAGING | Facility: HOSPITAL | Age: 80
End: 2025-01-23
Payer: MEDICARE

## 2025-01-23 DIAGNOSIS — M54.17 RADICULOPATHY, LUMBOSACRAL REGION: ICD-10-CM

## 2025-01-23 PROCEDURE — 72148 MRI LUMBAR SPINE W/O DYE: CPT

## 2025-07-07 ENCOUNTER — HOSPITAL ENCOUNTER (INPATIENT)
Facility: HOSPITAL | Age: 80
LOS: 3 days | Discharge: HOME | DRG: 208 | End: 2025-07-10
Attending: EMERGENCY MEDICINE | Admitting: STUDENT IN AN ORGANIZED HEALTH CARE EDUCATION/TRAINING PROGRAM
Payer: MEDICARE

## 2025-07-07 ENCOUNTER — APPOINTMENT (EMERGENCY)
Dept: RADIOLOGY | Facility: HOSPITAL | Age: 80
DRG: 208 | End: 2025-07-07
Payer: MEDICARE

## 2025-07-07 DIAGNOSIS — R06.00 DYSPNEA: ICD-10-CM

## 2025-07-07 DIAGNOSIS — I10 PRIMARY HYPERTENSION: ICD-10-CM

## 2025-07-07 DIAGNOSIS — I46.2 BRADYCARDIC CARDIAC ARREST (HCC): Primary | ICD-10-CM

## 2025-07-07 DIAGNOSIS — R53.1 GENERALIZED WEAKNESS: ICD-10-CM

## 2025-07-07 DIAGNOSIS — R00.1 BRADYCARDIC CARDIAC ARREST (HCC): Primary | ICD-10-CM

## 2025-07-07 DIAGNOSIS — N17.9 AKI (ACUTE KIDNEY INJURY) (HCC): ICD-10-CM

## 2025-07-07 DIAGNOSIS — M54.16 RADICULOPATHY, LUMBAR REGION: ICD-10-CM

## 2025-07-07 LAB
ALBUMIN SERPL BCG-MCNC: 3.8 G/DL (ref 3.5–5)
ALP SERPL-CCNC: 88 U/L (ref 34–104)
ALT SERPL W P-5'-P-CCNC: 50 U/L (ref 7–52)
ANION GAP SERPL CALCULATED.3IONS-SCNC: 11 MMOL/L (ref 4–13)
AST SERPL W P-5'-P-CCNC: 66 U/L (ref 13–39)
BASOPHILS # BLD AUTO: 0.07 THOUSANDS/ÂΜL (ref 0–0.1)
BASOPHILS NFR BLD AUTO: 1 % (ref 0–1)
BILIRUB SERPL-MCNC: 0.52 MG/DL (ref 0.2–1)
BNP SERPL-MCNC: 551 PG/ML (ref 0–100)
BUN SERPL-MCNC: 59 MG/DL (ref 5–25)
CALCIUM SERPL-MCNC: 8.4 MG/DL (ref 8.4–10.2)
CARDIAC TROPONIN I PNL SERPL HS: 6 NG/L (ref ?–50)
CHLORIDE SERPL-SCNC: 102 MMOL/L (ref 96–108)
CK SERPL-CCNC: 213 U/L (ref 26–192)
CO2 SERPL-SCNC: 21 MMOL/L (ref 21–32)
CREAT SERPL-MCNC: 2.1 MG/DL (ref 0.6–1.3)
EOSINOPHIL # BLD AUTO: 0.14 THOUSAND/ÂΜL (ref 0–0.61)
EOSINOPHIL NFR BLD AUTO: 2 % (ref 0–6)
ERYTHROCYTE [DISTWIDTH] IN BLOOD BY AUTOMATED COUNT: 13.3 % (ref 11.6–15.1)
FLUAV AG UPPER RESP QL IA.RAPID: NEGATIVE
FLUBV AG UPPER RESP QL IA.RAPID: NEGATIVE
GFR SERPL CREATININE-BSD FRML MDRD: 21 ML/MIN/1.73SQ M
GLUCOSE SERPL-MCNC: 257 MG/DL (ref 65–140)
HCT VFR BLD AUTO: 36.6 % (ref 34.8–46.1)
HGB BLD-MCNC: 11.5 G/DL (ref 11.5–15.4)
IMM GRANULOCYTES # BLD AUTO: 0.02 THOUSAND/UL (ref 0–0.2)
IMM GRANULOCYTES NFR BLD AUTO: 0 % (ref 0–2)
LYMPHOCYTES # BLD AUTO: 2.05 THOUSANDS/ÂΜL (ref 0.6–4.47)
LYMPHOCYTES NFR BLD AUTO: 24 % (ref 14–44)
MCH RBC QN AUTO: 29.8 PG (ref 26.8–34.3)
MCHC RBC AUTO-ENTMCNC: 31.4 G/DL (ref 31.4–37.4)
MCV RBC AUTO: 95 FL (ref 82–98)
MONOCYTES # BLD AUTO: 0.56 THOUSAND/ÂΜL (ref 0.17–1.22)
MONOCYTES NFR BLD AUTO: 7 % (ref 4–12)
NEUTROPHILS # BLD AUTO: 5.59 THOUSANDS/ÂΜL (ref 1.85–7.62)
NEUTS SEG NFR BLD AUTO: 66 % (ref 43–75)
NRBC BLD AUTO-RTO: 0 /100 WBCS
PLATELET # BLD AUTO: 237 THOUSANDS/UL (ref 149–390)
PMV BLD AUTO: 10.8 FL (ref 8.9–12.7)
POTASSIUM SERPL-SCNC: 5.3 MMOL/L (ref 3.5–5.3)
PROT SERPL-MCNC: 6.1 G/DL (ref 6.4–8.4)
RBC # BLD AUTO: 3.86 MILLION/UL (ref 3.81–5.12)
SARS-COV+SARS-COV-2 AG RESP QL IA.RAPID: NEGATIVE
SODIUM SERPL-SCNC: 134 MMOL/L (ref 135–147)
WBC # BLD AUTO: 8.43 THOUSAND/UL (ref 4.31–10.16)

## 2025-07-07 PROCEDURE — 92950 HEART/LUNG RESUSCITATION CPR: CPT | Performed by: EMERGENCY MEDICINE

## 2025-07-07 PROCEDURE — 99285 EMERGENCY DEPT VISIT HI MDM: CPT

## 2025-07-07 PROCEDURE — 93005 ELECTROCARDIOGRAM TRACING: CPT

## 2025-07-07 PROCEDURE — 80053 COMPREHEN METABOLIC PANEL: CPT | Performed by: EMERGENCY MEDICINE

## 2025-07-07 PROCEDURE — 99291 CRITICAL CARE FIRST HOUR: CPT | Performed by: EMERGENCY MEDICINE

## 2025-07-07 PROCEDURE — 87804 INFLUENZA ASSAY W/OPTIC: CPT | Performed by: EMERGENCY MEDICINE

## 2025-07-07 PROCEDURE — 85025 COMPLETE CBC W/AUTO DIFF WBC: CPT | Performed by: EMERGENCY MEDICINE

## 2025-07-07 PROCEDURE — 84484 ASSAY OF TROPONIN QUANT: CPT | Performed by: EMERGENCY MEDICINE

## 2025-07-07 PROCEDURE — 82550 ASSAY OF CK (CPK): CPT | Performed by: EMERGENCY MEDICINE

## 2025-07-07 PROCEDURE — 71045 X-RAY EXAM CHEST 1 VIEW: CPT

## 2025-07-07 PROCEDURE — 87811 SARS-COV-2 COVID19 W/OPTIC: CPT | Performed by: EMERGENCY MEDICINE

## 2025-07-07 PROCEDURE — 96360 HYDRATION IV INFUSION INIT: CPT

## 2025-07-07 PROCEDURE — 36415 COLL VENOUS BLD VENIPUNCTURE: CPT | Performed by: EMERGENCY MEDICINE

## 2025-07-07 PROCEDURE — 83880 ASSAY OF NATRIURETIC PEPTIDE: CPT | Performed by: EMERGENCY MEDICINE

## 2025-07-07 PROCEDURE — 31500 INSERT EMERGENCY AIRWAY: CPT | Performed by: EMERGENCY MEDICINE

## 2025-07-07 RX ORDER — ONDANSETRON 2 MG/ML
4 INJECTION INTRAMUSCULAR; INTRAVENOUS ONCE
Status: DISCONTINUED | OUTPATIENT
Start: 2025-07-08 | End: 2025-07-08

## 2025-07-07 RX ORDER — EPINEPHRINE 0.1 MG/ML
INJECTION INTRAVENOUS CODE/TRAUMA/SEDATION MEDICATION
Status: COMPLETED | OUTPATIENT
Start: 2025-07-07 | End: 2025-07-07

## 2025-07-07 RX ORDER — ATROPINE SULFATE 0.1 MG/ML
0.5 INJECTION INTRAVENOUS ONCE
Status: COMPLETED | OUTPATIENT
Start: 2025-07-08 | End: 2025-07-07

## 2025-07-07 RX ORDER — ACETAMINOPHEN 325 MG/1
975 TABLET ORAL ONCE
Status: COMPLETED | OUTPATIENT
Start: 2025-07-07 | End: 2025-07-07

## 2025-07-07 RX ORDER — ALBUMIN (HUMAN) 12.5 G/50ML
12.5 SOLUTION INTRAVENOUS ONCE
Status: COMPLETED | OUTPATIENT
Start: 2025-07-07 | End: 2025-07-08

## 2025-07-07 RX ADMIN — ALBUMIN (HUMAN) 12.5 G: 0.25 INJECTION, SOLUTION INTRAVENOUS at 23:49

## 2025-07-07 RX ADMIN — SODIUM CHLORIDE 1000 ML: 0.9 INJECTION, SOLUTION INTRAVENOUS at 22:38

## 2025-07-07 RX ADMIN — ATROPINE SULFATE 0.5 MG: 0.1 INJECTION INTRAVENOUS at 23:51

## 2025-07-07 RX ADMIN — EPINEPHRINE 1 MG: 0.1 INJECTION INTRAVENOUS at 23:55

## 2025-07-07 RX ADMIN — ACETAMINOPHEN 975 MG: 325 TABLET ORAL at 22:35

## 2025-07-08 ENCOUNTER — APPOINTMENT (INPATIENT)
Dept: RADIOLOGY | Facility: HOSPITAL | Age: 80
DRG: 208 | End: 2025-07-08
Payer: MEDICARE

## 2025-07-08 ENCOUNTER — APPOINTMENT (INPATIENT)
Dept: NON INVASIVE DIAGNOSTICS | Facility: HOSPITAL | Age: 80
DRG: 208 | End: 2025-07-08
Payer: MEDICARE

## 2025-07-08 PROBLEM — I46.2 BRADYCARDIC CARDIAC ARREST (HCC): Status: ACTIVE | Noted: 2025-07-08

## 2025-07-08 PROBLEM — R00.1 BRADYCARDIC CARDIAC ARREST (HCC): Status: ACTIVE | Noted: 2025-07-08

## 2025-07-08 PROBLEM — E87.5 HYPERKALEMIA: Status: ACTIVE | Noted: 2025-07-08

## 2025-07-08 PROBLEM — J96.02 ACUTE RESPIRATORY FAILURE WITH HYPERCAPNIA (HCC): Status: ACTIVE | Noted: 2025-07-08

## 2025-07-08 PROBLEM — I35.0 NONRHEUMATIC AORTIC VALVE STENOSIS: Status: ACTIVE | Noted: 2025-07-08

## 2025-07-08 PROBLEM — M19.90 ARTHRITIS: Status: ACTIVE | Noted: 2025-07-08

## 2025-07-08 LAB
2HR DELTA HS TROPONIN: 0 NG/L
2HR DELTA HS TROPONIN: 5 NG/L
4HR DELTA HS TROPONIN: 6 NG/L
ALBUMIN SERPL BCG-MCNC: 3.6 G/DL (ref 3.5–5)
ALBUMIN SERPL BCG-MCNC: 3.8 G/DL (ref 3.5–5)
ALP SERPL-CCNC: 83 U/L (ref 34–104)
ALP SERPL-CCNC: 86 U/L (ref 34–104)
ALT SERPL W P-5'-P-CCNC: 61 U/L (ref 7–52)
ALT SERPL W P-5'-P-CCNC: 64 U/L (ref 7–52)
ANION GAP SERPL CALCULATED.3IONS-SCNC: 12 MMOL/L (ref 4–13)
ANION GAP SERPL CALCULATED.3IONS-SCNC: 13 MMOL/L (ref 4–13)
ANION GAP SERPL CALCULATED.3IONS-SCNC: 16 MMOL/L (ref 4–13)
ANION GAP SERPL CALCULATED.3IONS-SCNC: 7 MMOL/L (ref 4–13)
ANISOCYTOSIS BLD QL SMEAR: PRESENT
AORTIC ROOT: 3 CM
AORTIC VALVE MEAN VELOCITY: 12.7 M/S
APTT PPP: 26 SECONDS (ref 23–34)
ASCENDING AORTA: 3.5 CM
AST SERPL W P-5'-P-CCNC: 80 U/L (ref 13–39)
AST SERPL W P-5'-P-CCNC: 88 U/L (ref 13–39)
ATRIAL RATE: 0 BPM
ATRIAL RATE: 100 BPM
ATRIAL RATE: 33 BPM
ATRIAL RATE: 36 BPM
ATRIAL RATE: 39 BPM
ATRIAL RATE: 46 BPM
ATRIAL RATE: 47 BPM
ATRIAL RATE: 74 BPM
ATRIAL RATE: 79 BPM
ATRIAL RATE: 81 BPM
ATRIAL RATE: 86 BPM
AV AREA BY CONTINUOUS VTI: 2.2 CM2
AV AREA PEAK VELOCITY: 1.7 CM2
AV LVOT MEAN GRADIENT: 3 MMHG
AV LVOT PEAK GRADIENT: 5 MMHG
AV MEAN PRESS GRAD SYS DOP V1V2: 8 MMHG
AV ORIFICE AREA US: 2.15 CM2
AV PEAK GRADIENT: 20 MMHG
AV VELOCITY RATIO: 0.69
AV VMAX SYS DOP: 2.3 M/S
BACTERIA UR QL AUTO: ABNORMAL /HPF
BASE EX.OXY STD BLDV CALC-SCNC: 89.7 % (ref 60–80)
BASE EXCESS BLDA CALC-SCNC: -7.6 MMOL/L
BASE EXCESS BLDV CALC-SCNC: -14.8 MMOL/L
BASOPHILS # BLD AUTO: 0.08 THOUSANDS/ÂΜL (ref 0–0.1)
BASOPHILS # BLD MANUAL: 0 THOUSAND/UL (ref 0–0.1)
BASOPHILS NFR BLD AUTO: 1 % (ref 0–1)
BASOPHILS NFR MAR MANUAL: 0 % (ref 0–1)
BILIRUB SERPL-MCNC: 0.47 MG/DL (ref 0.2–1)
BILIRUB SERPL-MCNC: 0.8 MG/DL (ref 0.2–1)
BILIRUB UR QL STRIP: NEGATIVE
BNP SERPL-MCNC: 450 PG/ML (ref 0–100)
BSA FOR ECHO PROCEDURE: 1.9 M2
BUN SERPL-MCNC: 53 MG/DL (ref 5–25)
BUN SERPL-MCNC: 55 MG/DL (ref 5–25)
BUN SERPL-MCNC: 58 MG/DL (ref 5–25)
BUN SERPL-MCNC: 59 MG/DL (ref 5–25)
BURR CELLS BLD QL SMEAR: PRESENT
CA-I BLD-SCNC: 0.98 MMOL/L (ref 1.12–1.32)
CA-I BLD-SCNC: 0.98 MMOL/L (ref 1.12–1.32)
CA-I BLD-SCNC: 1.09 MMOL/L (ref 1.12–1.32)
CALCIUM SERPL-MCNC: 7.8 MG/DL (ref 8.4–10.2)
CALCIUM SERPL-MCNC: 8 MG/DL (ref 8.4–10.2)
CALCIUM SERPL-MCNC: 8 MG/DL (ref 8.4–10.2)
CALCIUM SERPL-MCNC: 8.1 MG/DL (ref 8.4–10.2)
CARDIAC TROPONIN I PNL SERPL HS: 11 NG/L (ref ?–50)
CARDIAC TROPONIN I PNL SERPL HS: 16 NG/L (ref ?–50)
CARDIAC TROPONIN I PNL SERPL HS: 17 NG/L (ref ?–50)
CARDIAC TROPONIN I PNL SERPL HS: 6 NG/L (ref ?–50)
CHLORIDE SERPL-SCNC: 100 MMOL/L (ref 96–108)
CHLORIDE SERPL-SCNC: 101 MMOL/L (ref 96–108)
CHLORIDE SERPL-SCNC: 103 MMOL/L (ref 96–108)
CHLORIDE SERPL-SCNC: 103 MMOL/L (ref 96–108)
CHOLEST SERPL-MCNC: 129 MG/DL (ref ?–200)
CK SERPL-CCNC: 185 U/L (ref 26–192)
CLARITY UR: ABNORMAL
CO2 SERPL-SCNC: 17 MMOL/L (ref 21–32)
CO2 SERPL-SCNC: 18 MMOL/L (ref 21–32)
CO2 SERPL-SCNC: 19 MMOL/L (ref 21–32)
CO2 SERPL-SCNC: 25 MMOL/L (ref 21–32)
COLOR UR: YELLOW
CREAT SERPL-MCNC: 1.92 MG/DL (ref 0.6–1.3)
CREAT SERPL-MCNC: 1.96 MG/DL (ref 0.6–1.3)
CREAT SERPL-MCNC: 2.08 MG/DL (ref 0.6–1.3)
CREAT SERPL-MCNC: 2.19 MG/DL (ref 0.6–1.3)
DOP CALC AO VTI: 39.7 CM
DOP CALC LVOT AREA: 3.14 CM2
DOP CALC LVOT CARDIAC INDEX: 3.58 L/MIN/M2
DOP CALC LVOT CARDIAC OUTPUT: 6.8 L/MIN
DOP CALC LVOT DIAMETER: 2 CM
DOP CALC LVOT PEAK VEL VTI: 27.21 CM
DOP CALC LVOT PEAK VEL: 1.14 M/S
DOP CALC LVOT STROKE INDEX: 46.3 ML/M2
DOP CALC LVOT STROKE VOLUME: 85.44
E WAVE DECELERATION TIME: 247 MS
E/A RATIO: 1.08
EOSINOPHIL # BLD AUTO: 0.18 THOUSAND/ÂΜL (ref 0–0.61)
EOSINOPHIL # BLD MANUAL: 0 THOUSAND/UL (ref 0–0.4)
EOSINOPHIL NFR BLD AUTO: 2 % (ref 0–6)
EOSINOPHIL NFR BLD MANUAL: 0 % (ref 0–6)
ERYTHROCYTE [DISTWIDTH] IN BLOOD BY AUTOMATED COUNT: 13.2 % (ref 11.6–15.1)
ERYTHROCYTE [DISTWIDTH] IN BLOOD BY AUTOMATED COUNT: 13.2 % (ref 11.6–15.1)
EST. AVERAGE GLUCOSE BLD GHB EST-MCNC: 131 MG/DL
FRACTIONAL SHORTENING: 36 (ref 28–44)
GFR SERPL CREATININE-BSD FRML MDRD: 20 ML/MIN/1.73SQ M
GFR SERPL CREATININE-BSD FRML MDRD: 22 ML/MIN/1.73SQ M
GFR SERPL CREATININE-BSD FRML MDRD: 23 ML/MIN/1.73SQ M
GFR SERPL CREATININE-BSD FRML MDRD: 24 ML/MIN/1.73SQ M
GLUCOSE SERPL-MCNC: 104 MG/DL (ref 65–140)
GLUCOSE SERPL-MCNC: 107 MG/DL (ref 65–140)
GLUCOSE SERPL-MCNC: 183 MG/DL (ref 65–140)
GLUCOSE SERPL-MCNC: 203 MG/DL (ref 65–140)
GLUCOSE SERPL-MCNC: 279 MG/DL (ref 65–140)
GLUCOSE SERPL-MCNC: 284 MG/DL (ref 65–140)
GLUCOSE SERPL-MCNC: 296 MG/DL (ref 65–140)
GLUCOSE SERPL-MCNC: 317 MG/DL (ref 65–140)
GLUCOSE SERPL-MCNC: 317 MG/DL (ref 65–140)
GLUCOSE SERPL-MCNC: 321 MG/DL (ref 65–140)
GLUCOSE SERPL-MCNC: 337 MG/DL (ref 65–140)
GLUCOSE SERPL-MCNC: 90 MG/DL (ref 65–140)
GLUCOSE SERPL-MCNC: 95 MG/DL (ref 65–140)
GLUCOSE SERPL-MCNC: 97 MG/DL (ref 65–140)
GLUCOSE UR STRIP-MCNC: NEGATIVE MG/DL
HBA1C MFR BLD: 6.2 %
HCO3 BLDA-SCNC: 17.2 MMOL/L (ref 22–28)
HCO3 BLDV-SCNC: 16.9 MMOL/L (ref 24–30)
HCT VFR BLD AUTO: 33.8 % (ref 34.8–46.1)
HCT VFR BLD AUTO: 36.5 % (ref 34.8–46.1)
HDLC SERPL-MCNC: 28 MG/DL
HGB BLD-MCNC: 11.2 G/DL (ref 11.5–15.4)
HGB BLD-MCNC: 11.3 G/DL (ref 11.5–15.4)
HGB UR QL STRIP.AUTO: ABNORMAL
HOROWITZ INDEX BLDA+IHG-RTO: 100 MM[HG]
HYALINE CASTS #/AREA URNS LPF: ABNORMAL /LPF
IMM GRANULOCYTES # BLD AUTO: 0.07 THOUSAND/UL (ref 0–0.2)
IMM GRANULOCYTES NFR BLD AUTO: 1 % (ref 0–2)
INR PPP: 1.27 (ref 0.85–1.19)
INTERVENTRICULAR SEPTUM IN DIASTOLE (PARASTERNAL SHORT AXIS VIEW): 1.2 CM
INTERVENTRICULAR SEPTUM: 1.2 CM (ref 0.6–1.1)
KETONES UR STRIP-MCNC: NEGATIVE MG/DL
LAAS-AP2: 22.9 CM2
LAAS-AP4: 18.3 CM2
LACTATE SERPL-SCNC: 1.6 MMOL/L (ref 0.5–2)
LACTATE SERPL-SCNC: 3.4 MMOL/L (ref 0.5–2)
LACTATE SERPL-SCNC: 5.1 MMOL/L (ref 0.5–2)
LACTATE SERPL-SCNC: 5.5 MMOL/L (ref 0.5–2)
LACTATE SERPL-SCNC: 5.8 MMOL/L (ref 0.5–2)
LEFT ATRIUM SIZE: 3.6 CM
LEFT ATRIUM VOLUME (MOD BIPLANE): 60 ML
LEFT ATRIUM VOLUME INDEX (MOD BIPLANE): 31.6 ML/M2
LEFT INTERNAL DIMENSION IN SYSTOLE: 2.9 CM (ref 2.1–4)
LEFT VENTRICULAR INTERNAL DIMENSION IN DIASTOLE: 4.5 CM (ref 3.5–6)
LEFT VENTRICULAR POSTERIOR WALL IN END DIASTOLE: 1.1 CM
LEFT VENTRICULAR STROKE VOLUME: 61 ML
LEUKOCYTE ESTERASE UR QL STRIP: NEGATIVE
LV EF US.2D.A4C+ESTIMATED: 63 %
LVSV (TEICH): 61 ML
LYMPHOCYTES # BLD AUTO: 0.98 THOUSAND/UL (ref 0.6–4.47)
LYMPHOCYTES # BLD AUTO: 4.4 THOUSANDS/ÂΜL (ref 0.6–4.47)
LYMPHOCYTES # BLD AUTO: 8 % (ref 14–44)
LYMPHOCYTES NFR BLD AUTO: 36 % (ref 14–44)
MAGNESIUM SERPL-MCNC: 2.3 MG/DL (ref 1.9–2.7)
MAGNESIUM SERPL-MCNC: 2.4 MG/DL (ref 1.9–2.7)
MAGNESIUM SERPL-MCNC: 2.5 MG/DL (ref 1.9–2.7)
MCH RBC QN AUTO: 30.1 PG (ref 26.8–34.3)
MCH RBC QN AUTO: 31.1 PG (ref 26.8–34.3)
MCHC RBC AUTO-ENTMCNC: 31 G/DL (ref 31.4–37.4)
MCHC RBC AUTO-ENTMCNC: 33.1 G/DL (ref 31.4–37.4)
MCV RBC AUTO: 94 FL (ref 82–98)
MCV RBC AUTO: 97 FL (ref 82–98)
MONOCYTES # BLD AUTO: 0.25 THOUSAND/UL (ref 0–1.22)
MONOCYTES # BLD AUTO: 0.93 THOUSAND/ÂΜL (ref 0.17–1.22)
MONOCYTES NFR BLD AUTO: 8 % (ref 4–12)
MONOCYTES NFR BLD: 2 % (ref 4–12)
MV E'TISSUE VEL-SEP: 9 CM/S
MV PEAK A VEL: 1.07 M/S
MV PEAK E VEL: 116 CM/S
MV STENOSIS PRESSURE HALF TIME: 72 MS
MV VALVE AREA P 1/2 METHOD: 3.06
NEUTROPHILS # BLD AUTO: 6.6 THOUSANDS/ÂΜL (ref 1.85–7.62)
NEUTROPHILS # BLD MANUAL: 11.03 THOUSAND/UL (ref 1.85–7.62)
NEUTS BAND NFR BLD MANUAL: 5 % (ref 0–8)
NEUTS SEG NFR BLD AUTO: 52 % (ref 43–75)
NEUTS SEG NFR BLD AUTO: 85 % (ref 43–75)
NITRITE UR QL STRIP: NEGATIVE
NON-SQ EPI CELLS URNS QL MICRO: ABNORMAL /HPF
NONHDLC SERPL-MCNC: 101 MG/DL
NRBC BLD AUTO-RTO: 0 /100 WBCS
O2 CT BLDA-SCNC: 17.2 ML/DL (ref 16–23)
O2 CT BLDV-SCNC: 16.1 ML/DL
OXYHGB MFR BLDA: 98.2 % (ref 94–97)
P AXIS: -69 DEGREES
P AXIS: 52 DEGREES
PCO2 BLDA: 32.8 MM HG (ref 36–44)
PCO2 BLDV: 68.8 MM HG (ref 42–50)
PEEP RESPIRATORY: 10 CM[H2O]
PH BLDA: 7.34 [PH] (ref 7.35–7.45)
PH BLDV: 7.01 [PH] (ref 7.3–7.4)
PH UR STRIP.AUTO: 5.5 [PH]
PHOSPHATE SERPL-MCNC: 4.1 MG/DL (ref 2.3–4.1)
PHOSPHATE SERPL-MCNC: 4.2 MG/DL (ref 2.3–4.1)
PLATELET # BLD AUTO: 228 THOUSANDS/UL (ref 149–390)
PLATELET # BLD AUTO: 236 THOUSANDS/UL (ref 149–390)
PLATELET BLD QL SMEAR: ADEQUATE
PMV BLD AUTO: 11.2 FL (ref 8.9–12.7)
PMV BLD AUTO: 11.3 FL (ref 8.9–12.7)
PO2 BLDA: 350.5 MM HG (ref 75–129)
PO2 BLDV: 84.7 MM HG (ref 35–45)
POIKILOCYTOSIS BLD QL SMEAR: PRESENT
POTASSIUM SERPL-SCNC: 5 MMOL/L (ref 3.5–5.3)
POTASSIUM SERPL-SCNC: 5 MMOL/L (ref 3.5–5.3)
POTASSIUM SERPL-SCNC: 5.5 MMOL/L (ref 3.5–5.3)
POTASSIUM SERPL-SCNC: 5.6 MMOL/L (ref 3.5–5.3)
PR INTERVAL: 192 MS
PR INTERVAL: 408 MS
PROCALCITONIN SERPL-MCNC: 0.29 NG/ML
PROT SERPL-MCNC: 5.5 G/DL (ref 6.4–8.4)
PROT SERPL-MCNC: 6.1 G/DL (ref 6.4–8.4)
PROT UR STRIP-MCNC: ABNORMAL MG/DL
PROTHROMBIN TIME: 16.3 SECONDS (ref 12.3–15)
QRS AXIS: -79 DEGREES
QRS AXIS: 103 DEGREES
QRS AXIS: 106 DEGREES
QRS AXIS: 106 DEGREES
QRS AXIS: 111 DEGREES
QRS AXIS: 210 DEGREES
QRS AXIS: 210 DEGREES
QRS AXIS: 215 DEGREES
QRS AXIS: 228 DEGREES
QRS AXIS: 95 DEGREES
QRS AXIS: 99 DEGREES
QRSD INTERVAL: 100 MS
QRSD INTERVAL: 102 MS
QRSD INTERVAL: 106 MS
QRSD INTERVAL: 106 MS
QRSD INTERVAL: 110 MS
QRSD INTERVAL: 130 MS
QRSD INTERVAL: 134 MS
QRSD INTERVAL: 136 MS
QRSD INTERVAL: 136 MS
QRSD INTERVAL: 138 MS
QRSD INTERVAL: 98 MS
QT INTERVAL: 334 MS
QT INTERVAL: 352 MS
QT INTERVAL: 364 MS
QT INTERVAL: 376 MS
QT INTERVAL: 382 MS
QT INTERVAL: 386 MS
QT INTERVAL: 444 MS
QT INTERVAL: 504 MS
QT INTERVAL: 508 MS
QT INTERVAL: 518 MS
QT INTERVAL: 584 MS
QTC INTERVAL: 391 MS
QTC INTERVAL: 392 MS
QTC INTERVAL: 405 MS
QTC INTERVAL: 406 MS
QTC INTERVAL: 408 MS
QTC INTERVAL: 417 MS
QTC INTERVAL: 418 MS
QTC INTERVAL: 421 MS
QTC INTERVAL: 423 MS
QTC INTERVAL: 424 MS
QTC INTERVAL: 448 MS
RBC # BLD AUTO: 3.6 MILLION/UL (ref 3.81–5.12)
RBC # BLD AUTO: 3.76 MILLION/UL (ref 3.81–5.12)
RBC #/AREA URNS AUTO: ABNORMAL /HPF
RBC MORPH BLD: PRESENT
RIGHT ATRIUM AREA SYSTOLE A4C: 22 CM2
RIGHT VENTRICLE ID DIMENSION: 4.4 CM
SL CV LEFT ATRIUM LENGTH A2C: 5.6 CM
SL CV LV EF: 60
SL CV PED ECHO LEFT VENTRICLE DIASTOLIC VOLUME (MOD BIPLANE) 2D: 94 ML
SL CV PED ECHO LEFT VENTRICLE SYSTOLIC VOLUME (MOD BIPLANE) 2D: 33 ML
SODIUM SERPL-SCNC: 130 MMOL/L (ref 135–147)
SODIUM SERPL-SCNC: 133 MMOL/L (ref 135–147)
SODIUM SERPL-SCNC: 135 MMOL/L (ref 135–147)
SODIUM SERPL-SCNC: 136 MMOL/L (ref 135–147)
SP GR UR STRIP.AUTO: >=1.03
SPECIMEN SOURCE: ABNORMAL
T WAVE AXIS: -10 DEGREES
T WAVE AXIS: -16 DEGREES
T WAVE AXIS: -23 DEGREES
T WAVE AXIS: 17 DEGREES
T WAVE AXIS: 21 DEGREES
T WAVE AXIS: 55 DEGREES
T WAVE AXIS: 69 DEGREES
T WAVE AXIS: 75 DEGREES
T WAVE AXIS: 77 DEGREES
T WAVE AXIS: 83 DEGREES
T WAVE AXIS: 95 DEGREES
TR MAX PG: 27 MMHG
TR PEAK VELOCITY: 2.6 M/S
TRICUSPID ANNULAR PLANE SYSTOLIC EXCURSION: 2.4 CM
TRICUSPID VALVE PEAK REGURGITATION VELOCITY: 2.62 M/S
TRIGL SERPL-MCNC: 804 MG/DL (ref ?–150)
UROBILINOGEN UR QL STRIP.AUTO: 0.2 E.U./DL
VENT AC: 16
VENTRICULAR RATE: 27 BPM
VENTRICULAR RATE: 36 BPM
VENTRICULAR RATE: 37 BPM
VENTRICULAR RATE: 39 BPM
VENTRICULAR RATE: 54 BPM
VENTRICULAR RATE: 74 BPM
VENTRICULAR RATE: 76 BPM
VENTRICULAR RATE: 79 BPM
VENTRICULAR RATE: 81 BPM
VENTRICULAR RATE: 85 BPM
VENTRICULAR RATE: 90 BPM
VT SETTING VENT: 500 ML
WBC # BLD AUTO: 12.25 THOUSAND/UL (ref 4.31–10.16)
WBC # BLD AUTO: 12.26 THOUSAND/UL (ref 4.31–10.16)
WBC #/AREA URNS AUTO: ABNORMAL /HPF

## 2025-07-08 PROCEDURE — 31500 INSERT EMERGENCY AIRWAY: CPT

## 2025-07-08 PROCEDURE — 94002 VENT MGMT INPAT INIT DAY: CPT

## 2025-07-08 PROCEDURE — 84145 PROCALCITONIN (PCT): CPT

## 2025-07-08 PROCEDURE — 83036 HEMOGLOBIN GLYCOSYLATED A1C: CPT

## 2025-07-08 PROCEDURE — 83605 ASSAY OF LACTIC ACID: CPT | Performed by: NURSE PRACTITIONER

## 2025-07-08 PROCEDURE — 85027 COMPLETE CBC AUTOMATED: CPT

## 2025-07-08 PROCEDURE — 85007 BL SMEAR W/DIFF WBC COUNT: CPT

## 2025-07-08 PROCEDURE — 81001 URINALYSIS AUTO W/SCOPE: CPT

## 2025-07-08 PROCEDURE — 93306 TTE W/DOPPLER COMPLETE: CPT | Performed by: INTERNAL MEDICINE

## 2025-07-08 PROCEDURE — 94150 VITAL CAPACITY TEST: CPT

## 2025-07-08 PROCEDURE — 92950 HEART/LUNG RESUSCITATION CPR: CPT

## 2025-07-08 PROCEDURE — 82948 REAGENT STRIP/BLOOD GLUCOSE: CPT

## 2025-07-08 PROCEDURE — 71045 X-RAY EXAM CHEST 1 VIEW: CPT

## 2025-07-08 PROCEDURE — 85025 COMPLETE CBC W/AUTO DIFF WBC: CPT

## 2025-07-08 PROCEDURE — 36415 COLL VENOUS BLD VENIPUNCTURE: CPT | Performed by: EMERGENCY MEDICINE

## 2025-07-08 PROCEDURE — 99222 1ST HOSP IP/OBS MODERATE 55: CPT | Performed by: INTERNAL MEDICINE

## 2025-07-08 PROCEDURE — 93010 ELECTROCARDIOGRAM REPORT: CPT | Performed by: INTERNAL MEDICINE

## 2025-07-08 PROCEDURE — 83735 ASSAY OF MAGNESIUM: CPT | Performed by: NURSE PRACTITIONER

## 2025-07-08 PROCEDURE — 83735 ASSAY OF MAGNESIUM: CPT

## 2025-07-08 PROCEDURE — 84484 ASSAY OF TROPONIN QUANT: CPT

## 2025-07-08 PROCEDURE — 5A12012 PERFORMANCE OF CARDIAC OUTPUT, SINGLE, MANUAL: ICD-10-PCS | Performed by: EMERGENCY MEDICINE

## 2025-07-08 PROCEDURE — 94760 N-INVAS EAR/PLS OXIMETRY 1: CPT

## 2025-07-08 PROCEDURE — 84100 ASSAY OF PHOSPHORUS: CPT

## 2025-07-08 PROCEDURE — 82805 BLOOD GASES W/O2 SATURATION: CPT

## 2025-07-08 PROCEDURE — 83880 ASSAY OF NATRIURETIC PEPTIDE: CPT

## 2025-07-08 PROCEDURE — C8929 TTE W OR WO FOL WCON,DOPPLER: HCPCS

## 2025-07-08 PROCEDURE — 0BH17EZ INSERTION OF ENDOTRACHEAL AIRWAY INTO TRACHEA, VIA NATURAL OR ARTIFICIAL OPENING: ICD-10-PCS | Performed by: EMERGENCY MEDICINE

## 2025-07-08 PROCEDURE — 80053 COMPREHEN METABOLIC PANEL: CPT

## 2025-07-08 PROCEDURE — 82330 ASSAY OF CALCIUM: CPT

## 2025-07-08 PROCEDURE — 80061 LIPID PANEL: CPT

## 2025-07-08 PROCEDURE — 85730 THROMBOPLASTIN TIME PARTIAL: CPT

## 2025-07-08 PROCEDURE — 93005 ELECTROCARDIOGRAM TRACING: CPT

## 2025-07-08 PROCEDURE — 82330 ASSAY OF CALCIUM: CPT | Performed by: NURSE PRACTITIONER

## 2025-07-08 PROCEDURE — 99291 CRITICAL CARE FIRST HOUR: CPT | Performed by: INTERNAL MEDICINE

## 2025-07-08 PROCEDURE — 85610 PROTHROMBIN TIME: CPT

## 2025-07-08 PROCEDURE — 82550 ASSAY OF CK (CPK): CPT | Performed by: NURSE PRACTITIONER

## 2025-07-08 PROCEDURE — 5A1935Z RESPIRATORY VENTILATION, LESS THAN 24 CONSECUTIVE HOURS: ICD-10-PCS | Performed by: EMERGENCY MEDICINE

## 2025-07-08 PROCEDURE — 94640 AIRWAY INHALATION TREATMENT: CPT

## 2025-07-08 PROCEDURE — 83605 ASSAY OF LACTIC ACID: CPT

## 2025-07-08 PROCEDURE — 94664 DEMO&/EVAL PT USE INHALER: CPT

## 2025-07-08 PROCEDURE — 80048 BASIC METABOLIC PNL TOTAL CA: CPT | Performed by: NURSE PRACTITIONER

## 2025-07-08 PROCEDURE — 84484 ASSAY OF TROPONIN QUANT: CPT | Performed by: EMERGENCY MEDICINE

## 2025-07-08 RX ORDER — DEXTROSE MONOHYDRATE 25 G/50ML
25 INJECTION, SOLUTION INTRAVENOUS ONCE
Status: COMPLETED | OUTPATIENT
Start: 2025-07-08 | End: 2025-07-08

## 2025-07-08 RX ORDER — CHLORHEXIDINE GLUCONATE ORAL RINSE 1.2 MG/ML
15 SOLUTION DENTAL EVERY 12 HOURS SCHEDULED
Status: DISCONTINUED | OUTPATIENT
Start: 2025-07-08 | End: 2025-07-09

## 2025-07-08 RX ORDER — PROPOFOL 10 MG/ML
5-50 INJECTION, EMULSION INTRAVENOUS
Status: DISCONTINUED | OUTPATIENT
Start: 2025-07-08 | End: 2025-07-08

## 2025-07-08 RX ORDER — INSULIN LISPRO 100 [IU]/ML
1-5 INJECTION, SOLUTION INTRAVENOUS; SUBCUTANEOUS
Status: DISCONTINUED | OUTPATIENT
Start: 2025-07-08 | End: 2025-07-09

## 2025-07-08 RX ORDER — CELECOXIB 200 MG/1
200 CAPSULE ORAL 2 TIMES DAILY
COMMUNITY

## 2025-07-08 RX ORDER — HEPARIN SODIUM 5000 [USP'U]/ML
5000 INJECTION, SOLUTION INTRAVENOUS; SUBCUTANEOUS EVERY 8 HOURS SCHEDULED
Status: DISCONTINUED | OUTPATIENT
Start: 2025-07-08 | End: 2025-07-10 | Stop reason: HOSPADM

## 2025-07-08 RX ORDER — HYDROCHLOROTHIAZIDE 25 MG/1
25 TABLET ORAL EVERY OTHER DAY
COMMUNITY
Start: 2025-07-02 | End: 2025-07-10

## 2025-07-08 RX ORDER — FENTANYL CITRATE/PF 50 MCG/ML
25 SYRINGE (ML) INJECTION ONCE
Refills: 0 | Status: COMPLETED | OUTPATIENT
Start: 2025-07-08 | End: 2025-07-08

## 2025-07-08 RX ORDER — SODIUM CHLORIDE, SODIUM GLUCONATE, SODIUM ACETATE, POTASSIUM CHLORIDE, MAGNESIUM CHLORIDE, SODIUM PHOSPHATE, DIBASIC, AND POTASSIUM PHOSPHATE .53; .5; .37; .037; .03; .012; .00082 G/100ML; G/100ML; G/100ML; G/100ML; G/100ML; G/100ML; G/100ML
500 INJECTION, SOLUTION INTRAVENOUS ONCE
Status: COMPLETED | OUTPATIENT
Start: 2025-07-08 | End: 2025-07-08

## 2025-07-08 RX ORDER — LISINOPRIL 20 MG/1
1 TABLET ORAL 2 TIMES DAILY
Status: ON HOLD | COMMUNITY
Start: 2025-06-04 | End: 2025-07-10

## 2025-07-08 RX ORDER — NEBIVOLOL 5 MG/1
5 TABLET ORAL DAILY
COMMUNITY
End: 2025-07-10

## 2025-07-08 RX ORDER — CALCIUM GLUCONATE 20 MG/ML
2 INJECTION, SOLUTION INTRAVENOUS ONCE
Status: COMPLETED | OUTPATIENT
Start: 2025-07-08 | End: 2025-07-08

## 2025-07-08 RX ORDER — GABAPENTIN 300 MG/1
1 CAPSULE ORAL 4 TIMES DAILY
Status: ON HOLD | COMMUNITY
Start: 2025-07-02 | End: 2025-07-10

## 2025-07-08 RX ORDER — INSULIN LISPRO 100 [IU]/ML
2-12 INJECTION, SOLUTION INTRAVENOUS; SUBCUTANEOUS EVERY 6 HOURS SCHEDULED
Status: DISCONTINUED | OUTPATIENT
Start: 2025-07-08 | End: 2025-07-08

## 2025-07-08 RX ORDER — CALCIUM GLUCONATE 20 MG/ML
1 INJECTION, SOLUTION INTRAVENOUS ONCE
Status: COMPLETED | OUTPATIENT
Start: 2025-07-08 | End: 2025-07-08

## 2025-07-08 RX ORDER — INDOMETHACIN 25 MG/1
50 CAPSULE ORAL ONCE
Status: COMPLETED | OUTPATIENT
Start: 2025-07-08 | End: 2025-07-08

## 2025-07-08 RX ORDER — ETOMIDATE 2 MG/ML
INJECTION INTRAVENOUS CODE/TRAUMA/SEDATION MEDICATION
Status: COMPLETED | OUTPATIENT
Start: 2025-07-08 | End: 2025-07-08

## 2025-07-08 RX ORDER — PANTOPRAZOLE SODIUM 40 MG/10ML
40 INJECTION, POWDER, LYOPHILIZED, FOR SOLUTION INTRAVENOUS
Status: DISCONTINUED | OUTPATIENT
Start: 2025-07-08 | End: 2025-07-09

## 2025-07-08 RX ORDER — FENTANYL CITRATE-0.9 % NACL/PF 10 MCG/ML
50 PLASTIC BAG, INJECTION (ML) INTRAVENOUS CONTINUOUS
Status: DISCONTINUED | OUTPATIENT
Start: 2025-07-08 | End: 2025-07-08

## 2025-07-08 RX ORDER — MELOXICAM 15 MG/1
1 TABLET ORAL DAILY
COMMUNITY

## 2025-07-08 RX ORDER — ALBUTEROL SULFATE 5 MG/ML
10 SOLUTION RESPIRATORY (INHALATION) ONCE
Status: COMPLETED | OUTPATIENT
Start: 2025-07-08 | End: 2025-07-08

## 2025-07-08 RX ORDER — SODIUM CHLORIDE, SODIUM GLUCONATE, SODIUM ACETATE, POTASSIUM CHLORIDE, MAGNESIUM CHLORIDE, SODIUM PHOSPHATE, DIBASIC, AND POTASSIUM PHOSPHATE .53; .5; .37; .037; .03; .012; .00082 G/100ML; G/100ML; G/100ML; G/100ML; G/100ML; G/100ML; G/100ML
50 INJECTION, SOLUTION INTRAVENOUS CONTINUOUS
Status: DISCONTINUED | OUTPATIENT
Start: 2025-07-08 | End: 2025-07-09

## 2025-07-08 RX ORDER — SODIUM CHLORIDE FOR INHALATION 0.9 %
3 VIAL, NEBULIZER (ML) INHALATION ONCE
Status: COMPLETED | OUTPATIENT
Start: 2025-07-08 | End: 2025-07-08

## 2025-07-08 RX ORDER — INSULIN LISPRO 100 [IU]/ML
1-6 INJECTION, SOLUTION INTRAVENOUS; SUBCUTANEOUS EVERY 6 HOURS SCHEDULED
Status: DISCONTINUED | OUTPATIENT
Start: 2025-07-08 | End: 2025-07-08

## 2025-07-08 RX ORDER — SODIUM CHLORIDE, SODIUM GLUCONATE, SODIUM ACETATE, POTASSIUM CHLORIDE, MAGNESIUM CHLORIDE, SODIUM PHOSPHATE, DIBASIC, AND POTASSIUM PHOSPHATE .53; .5; .37; .037; .03; .012; .00082 G/100ML; G/100ML; G/100ML; G/100ML; G/100ML; G/100ML; G/100ML
1000 INJECTION, SOLUTION INTRAVENOUS ONCE
Status: COMPLETED | OUTPATIENT
Start: 2025-07-08 | End: 2025-07-08

## 2025-07-08 RX ORDER — PROPOFOL 10 MG/ML
INJECTION, EMULSION INTRAVENOUS
Status: COMPLETED
Start: 2025-07-08 | End: 2025-07-08

## 2025-07-08 RX ORDER — FENTANYL CITRATE 50 UG/ML
50 INJECTION, SOLUTION INTRAMUSCULAR; INTRAVENOUS EVERY 2 HOUR PRN
Refills: 0 | Status: DISCONTINUED | OUTPATIENT
Start: 2025-07-08 | End: 2025-07-08

## 2025-07-08 RX ORDER — VERAPAMIL HYDROCHLORIDE 120 MG/1
120 TABLET ORAL 3 TIMES DAILY
COMMUNITY
End: 2025-07-10

## 2025-07-08 RX ORDER — SUCCINYLCHOLINE/SOD CL,ISO/PF 100 MG/5ML
SYRINGE (ML) INTRAVENOUS CODE/TRAUMA/SEDATION MEDICATION
Status: COMPLETED | OUTPATIENT
Start: 2025-07-08 | End: 2025-07-08

## 2025-07-08 RX ADMIN — SODIUM BICARBONATE 50 MEQ: 84 INJECTION, SOLUTION INTRAVENOUS at 05:53

## 2025-07-08 RX ADMIN — HEPARIN SODIUM 5000 UNITS: 5000 INJECTION INTRAVENOUS; SUBCUTANEOUS at 21:25

## 2025-07-08 RX ADMIN — PROPOFOL 50 MCG/KG/MIN: 10 INJECTION, EMULSION INTRAVENOUS at 03:31

## 2025-07-08 RX ADMIN — FENTANYL CITRATE 50 MCG: 50 INJECTION INTRAMUSCULAR; INTRAVENOUS at 02:09

## 2025-07-08 RX ADMIN — CHLORHEXIDINE GLUCONATE 15 ML: 1.2 SOLUTION ORAL at 02:09

## 2025-07-08 RX ADMIN — CHLORHEXIDINE GLUCONATE 15 ML: 1.2 SOLUTION ORAL at 09:09

## 2025-07-08 RX ADMIN — PROPOFOL 30 MCG/KG/MIN: 10 INJECTION, EMULSION INTRAVENOUS at 05:52

## 2025-07-08 RX ADMIN — HEPARIN SODIUM 5000 UNITS: 5000 INJECTION INTRAVENOUS; SUBCUTANEOUS at 09:09

## 2025-07-08 RX ADMIN — Medication 25 MCG/HR: at 00:39

## 2025-07-08 RX ADMIN — INSULIN LISPRO 8 UNITS: 100 INJECTION, SOLUTION INTRAVENOUS; SUBCUTANEOUS at 05:31

## 2025-07-08 RX ADMIN — SODIUM CHLORIDE 10 UNITS: 9 INJECTION, SOLUTION INTRAMUSCULAR; INTRAVENOUS; SUBCUTANEOUS at 06:40

## 2025-07-08 RX ADMIN — CHLORHEXIDINE GLUCONATE 15 ML: 1.2 SOLUTION ORAL at 21:25

## 2025-07-08 RX ADMIN — PROPOFOL 40 MCG/KG/MIN: 10 INJECTION, EMULSION INTRAVENOUS at 00:27

## 2025-07-08 RX ADMIN — ETOMIDATE 20 MG: 20 INJECTION, SOLUTION INTRAVENOUS at 00:00

## 2025-07-08 RX ADMIN — CALCIUM GLUCONATE 1 G: 20 INJECTION, SOLUTION INTRAVENOUS at 12:05

## 2025-07-08 RX ADMIN — PERFLUTREN 0.4 ML/MIN: 6.52 INJECTION, SUSPENSION INTRAVENOUS at 08:00

## 2025-07-08 RX ADMIN — CALCIUM GLUCONATE 1 G: 20 INJECTION, SOLUTION INTRAVENOUS at 05:54

## 2025-07-08 RX ADMIN — Medication 100 MG: at 00:00

## 2025-07-08 RX ADMIN — PANTOPRAZOLE SODIUM 40 MG: 40 INJECTION, POWDER, FOR SOLUTION INTRAVENOUS at 08:23

## 2025-07-08 RX ADMIN — EPINEPHRINE 5 MCG/MIN: 1 INJECTION INTRAMUSCULAR; INTRAVENOUS; SUBCUTANEOUS at 00:06

## 2025-07-08 RX ADMIN — SODIUM CHLORIDE, SODIUM GLUCONATE, SODIUM ACETATE, POTASSIUM CHLORIDE, MAGNESIUM CHLORIDE, SODIUM PHOSPHATE, DIBASIC, AND POTASSIUM PHOSPHATE 500 ML: .53; .5; .37; .037; .03; .012; .00082 INJECTION, SOLUTION INTRAVENOUS at 09:02

## 2025-07-08 RX ADMIN — DEXTROSE MONOHYDRATE 25 ML: 25 INJECTION, SOLUTION INTRAVENOUS at 05:53

## 2025-07-08 RX ADMIN — CALCIUM GLUCONATE 2 G: 20 INJECTION, SOLUTION INTRAVENOUS at 20:05

## 2025-07-08 RX ADMIN — FENTANYL CITRATE 25 MCG: 50 INJECTION INTRAMUSCULAR; INTRAVENOUS at 07:14

## 2025-07-08 RX ADMIN — HEPARIN SODIUM 5000 UNITS: 5000 INJECTION INTRAVENOUS; SUBCUTANEOUS at 02:09

## 2025-07-08 RX ADMIN — ISODIUM CHLORIDE 3 ML: 0.03 SOLUTION RESPIRATORY (INHALATION) at 06:00

## 2025-07-08 RX ADMIN — PROPOFOL 25 MCG/KG/MIN: 10 INJECTION, EMULSION INTRAVENOUS at 08:24

## 2025-07-08 RX ADMIN — ALBUTEROL SULFATE 10 MG: 2.5 SOLUTION RESPIRATORY (INHALATION) at 06:00

## 2025-07-08 RX ADMIN — SODIUM CHLORIDE, SODIUM GLUCONATE, SODIUM ACETATE, POTASSIUM CHLORIDE, MAGNESIUM CHLORIDE, SODIUM PHOSPHATE, DIBASIC, AND POTASSIUM PHOSPHATE 100 ML/HR: .53; .5; .37; .037; .03; .012; .00082 INJECTION, SOLUTION INTRAVENOUS at 12:05

## 2025-07-08 RX ADMIN — SODIUM CHLORIDE, SODIUM GLUCONATE, SODIUM ACETATE, POTASSIUM CHLORIDE, MAGNESIUM CHLORIDE, SODIUM PHOSPHATE, DIBASIC, AND POTASSIUM PHOSPHATE 1000 ML: .53; .5; .37; .037; .03; .012; .00082 INJECTION, SOLUTION INTRAVENOUS at 02:56

## 2025-07-08 RX ADMIN — INSULIN LISPRO 5 UNITS: 100 INJECTION, SOLUTION INTRAVENOUS; SUBCUTANEOUS at 03:05

## 2025-07-08 RX ADMIN — SODIUM CHLORIDE 5 UNITS/HR: 9 INJECTION, SOLUTION INTRAVENOUS at 08:05

## 2025-07-08 NOTE — ASSESSMENT & PLAN NOTE
Meds on hold at this point related to relative hypotension and volume depletion.  I do not anticipate restarting verapamil and/or metoprolol.

## 2025-07-08 NOTE — RESPIRATORY THERAPY NOTE
07/08/25 0833   Respiratory Assessment   Assessment Type Assess only   General Appearance Sedated   Respiratory Pattern Assisted   Chest Assessment Chest expansion symmetrical   Bilateral Breath Sounds Clear;Diminished   Vent Information   Vent ID [id number]   Vent type     Vent Mode AC/VC   SpO2 99 %   AC/VC Settings   Resp Rate (BPM) 16 BPM   Vt (mL) 500 mL   FIO2 (%) (S)  60 %  (Fio2 decreased to 50 % . RN Jaydon  and Al NEWBY aware)   PEEP (cmH2O) 6 cmH2O   Flow Pattern (LPM) 60 L/min   Trigger Sensitivity Flow (lpm) 3 %   Humidification Heater   Heater Temperature (Set) 98.6 °F (37 °C)   AC/VC Actuals   Resp Rate (BPM) 16 BPM   VT (mL) 550   MV 8.75   MAP (cmH2O) 10 cmH2O   Peak Pressure (cmH2O) 24 cmH2O   I/E Ratio (Obs) 1:3.2   Heater Temperature (Obs) 98.6 °F (37 °C)   AC/VC Alarms   High Peak Pressure (cmH2O) 50   High Resp Rate (BPM) 40 BPM   High MV (L/min) 20 L/min   Low MV (L/min) 4 L/min   Vt High (mL) 1000 mL   Vt Low (mL) 400 mL   AC/VC Apnea Settings   Resp Rate (BPM) 16 BPM   VT (mL) 500 mL   FIO2 (%) 100 %   Apnea Time (s) 20 S   ETT  Oral 7 mm   Placement Date/Time: 07/08/25 0002   Type: Oral  Tube Size: 7 mm   Secured at (cm) 24   Measured from Lips   Secured Location Right   Repositioned Right to Center   Secured by Commercial tube hightower   Cuff Pressure (color) Green   HI-LO Suction  Continuous low suction   HI-LO Secretions Small;Clear   HI-LO Intervention Patent

## 2025-07-08 NOTE — ED PROVIDER NOTES
ED Disposition       None          Assessment & Plan       Medical Decision Making  79-year-old female presenting for generalized weakness, fatigue, dyspnea.  Symptoms for the last few days.  Also admits to body aches.  Initial vitals heart rate 52, blood pressure 105.  Patient awake and alert.  Differential included muscle pain versus neuropathy versus rhabdo.  Terms of dyspnea differential clues viral URI versus pneumonia versus CHF.  Given dyspnea also concern for possible ACS.  Plan was to obtain a cardiac workup, chest x-ray.  BMP showed that she had an DEMETRIUS from her baseline.  Patient became more bradycardic while in the department.  Blood pressures are on the softer side with a MAP of 60.  Plan was to start on an epi drip, however patient unfortunately had a bradycardic arrest.  CPR was started and pulses were obtained shortly afterwards after 1 dose of push dose epi.  Patient's heart rate and blood pressure improved after intubation.  She is now in A-fib.  Patient was started on the epi drip.  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  Plan is to admit to the ICU here for supportive care.    Problems Addressed:  DEMETRIUS (acute kidney injury) (HCC): acute illness or injury  Bradycardic cardiac arrest (HCC): acute illness or injury  Dyspnea: acute illness or injury  Generalized weakness: acute illness or injury    Amount and/or Complexity of Data Reviewed  Labs: ordered.  Radiology: ordered.    Risk  OTC drugs.  Prescription drug management.  Decision regarding hospitalization.        ED Course as of 07/08/25 0041   Mon Jul 07, 2025 2319 Patient satting 89% on room air.  No increased work of breathing.  Will add on BNP for possible fluid overload.  Patient started on 2 L nasal cannula   2339 Patient now found to be bradycardic with softer blood pressures.  Is awake alert and oriented.   Dr. Putnam of cardiology reviewed the EKG and thinks that the rhythm is junctional..  Said to start on Levophed.       Medications   sodium chloride 0.9 % bolus 1,000 mL (1,000 mL Intravenous New Bag 7/7/25 2238)   acetaminophen (TYLENOL) tablet 975 mg (975 mg Oral Given 7/7/25 2235)       ED Risk Strat Scores                    No data recorded        SBIRT 22yo+      Flowsheet Row Most Recent Value   Initial Alcohol Screen: US AUDIT-C     1. How often do you have a drink containing alcohol? 0 Filed at: 07/07/2025 2202   2. How many drinks containing alcohol do you have on a typical day you are drinking?  0 Filed at: 07/07/2025 2202   3a. Male UNDER 65: How often do you have five or more drinks on one occasion? 0 Filed at: 07/07/2025 2202   3b. FEMALE Any Age, or MALE 65+: How often do you have 4 or more drinks on one occassion? 0 Filed at: 07/07/2025 2202   Audit-C Score 0 Filed at: 07/07/2025 2202   FERNANDO: How many times in the past year have you...    Used an illegal drug or used a prescription medication for non-medical reasons? Never Filed at: 07/07/2025 2202                            History of Present Illness       Chief Complaint   Patient presents with    Weakness - Generalized     Pt reports weakness, chest pressure and shortness of breath x past week. Patient reports she ran out of her home medications this past week and received a new script of her medications on July 4th.        Past Medical History[1]   Past Surgical History[2]   Family History[3]   Social History[4]   E-Cigarette/Vaping    E-Cigarette Use Never User       E-Cigarette/Vaping Substances      I have reviewed and agree with the history as documented.     Patient is an 79-year-old female who presents for evaluation of generalized weakness, body aches, dyspnea.  Patient says she has had the symptoms over the last few days.  Says she mainly gets short of breath with any type of exertion.  Denies associated chest pain.  Patient had been off  her meds for a few days but has been taking them over the last couple days.  Denies any cough, fevers or chills.        Review of Systems   Constitutional:  Positive for fatigue. Negative for fever and unexpected weight change.   HENT:  Negative for congestion, ear pain, sore throat and trouble swallowing.    Eyes:  Negative for pain and redness.   Respiratory:  Positive for shortness of breath. Negative for cough and chest tightness.    Cardiovascular:  Negative for chest pain and leg swelling.   Gastrointestinal:  Negative for abdominal distention, abdominal pain, diarrhea and vomiting.   Endocrine: Negative for polyuria.   Genitourinary:  Negative for dysuria, hematuria, pelvic pain and vaginal bleeding.   Musculoskeletal:  Positive for arthralgias. Negative for back pain and myalgias.   Skin:  Negative for rash.   Neurological:  Positive for weakness (generalized). Negative for dizziness, syncope, light-headedness and headaches.           Objective       ED Triage Vitals [07/07/25 2202]   Temperature Pulse Blood Pressure Respirations SpO2 Patient Position - Orthostatic VS   97.5 °F (36.4 °C) (!) 52 105/58 16 92 % Sitting      Temp Source Heart Rate Source BP Location FiO2 (%) Pain Score    Oral -- -- -- 8      Vitals      Date and Time Temp Pulse SpO2 Resp BP Pain Score FACES Pain Rating User   07/07/25 2235 -- -- -- -- -- 8 -- AM   07/07/25 2202 97.5 °F (36.4 °C) 52 92 % 16 105/58 8 -- AM            Physical Exam  Vitals and nursing note reviewed.   Constitutional:       General: She is not in acute distress.     Appearance: She is well-developed.   HENT:      Head: Normocephalic and atraumatic.      Right Ear: External ear normal.      Left Ear: External ear normal.      Nose: Nose normal.      Mouth/Throat:      Mouth: Mucous membranes are moist.      Pharynx: No oropharyngeal exudate.     Eyes:      Conjunctiva/sclera: Conjunctivae normal.      Pupils: Pupils are equal, round, and reactive to light.        Cardiovascular:      Rate and Rhythm: Regular rhythm. Bradycardia present.      Heart sounds: Normal heart sounds. No murmur heard.     No friction rub. No gallop.   Pulmonary:      Effort: Pulmonary effort is normal. No respiratory distress.      Breath sounds: Normal breath sounds. No wheezing or rales.   Abdominal:      General: There is no distension.      Palpations: Abdomen is soft.      Tenderness: There is no abdominal tenderness. There is no guarding.     Musculoskeletal:         General: No swelling, tenderness or deformity. Normal range of motion.      Cervical back: Normal range of motion and neck supple.   Lymphadenopathy:      Cervical: No cervical adenopathy.     Skin:     General: Skin is warm and dry.     Neurological:      General: No focal deficit present.      Mental Status: She is alert and oriented to person, place, and time. Mental status is at baseline.      Cranial Nerves: No cranial nerve deficit.      Sensory: No sensory deficit.      Motor: No weakness or abnormal muscle tone.      Coordination: Coordination normal.         Results Reviewed       Procedure Component Value Units Date/Time    FLU/RSV/COVID - if FLU/RSV clinically relevant (2hr TAT) [[id number]] Collected: 07/07/25 2247    Lab Status: No result Specimen: Nares from Nose     Comprehensive metabolic panel [[id number]]  (Abnormal) Collected: 07/07/25 2214    Lab Status: Final result Specimen: Blood from Arm, Right Updated: 07/07/25 2232     Sodium 134 mmol/L      Potassium 5.3 mmol/L      Chloride 102 mmol/L      CO2 21 mmol/L      ANION GAP 11 mmol/L      BUN 59 mg/dL      Creatinine 2.10 mg/dL      Glucose 257 mg/dL      Calcium 8.4 mg/dL      AST 66 U/L      ALT 50 U/L      Alkaline Phosphatase 88 U/L      Total Protein 6.1 g/dL      Albumin 3.8 g/dL      Total Bilirubin 0.52 mg/dL      eGFR 21 ml/min/1.73sq m     Narrative:      [employer] guidelines for Chronic Kidney Disease (CKD):     Stage 1  with normal or high GFR (GFR > 90 mL/min/1.73 square meters)    Stage 2 Mild CKD (GFR = 60-89 mL/min/1.73 square meters)    Stage 3A Moderate CKD (GFR = 45-59 mL/min/1.73 square meters)    Stage 3B Moderate CKD (GFR = 30-44 mL/min/1.73 square meters)    Stage 4 Severe CKD (GFR = 15-29 mL/min/1.73 square meters)    Stage 5 End Stage CKD (GFR <15 mL/min/1.73 square meters)  Note: GFR calculation is accurate only with a steady state creatinine    CK [[id number]]  (Abnormal) Collected: 07/07/25 2214    Lab Status: Final result Specimen: Blood from Arm, Right Updated: 07/07/25 2232     Total  U/L     CBC and differential [[id number]] Collected: 07/07/25 2214    Lab Status: Final result Specimen: Blood from Arm, Right Updated: 07/07/25 2218     WBC 8.43 Thousand/uL      RBC 3.86 Million/uL      Hemoglobin 11.5 g/dL      Hematocrit 36.6 %      MCV 95 fL      MCH 29.8 pg      MCHC 31.4 g/dL      RDW 13.3 %      MPV 10.8 fL      Platelets 237 Thousands/uL      nRBC 0 /100 WBCs      Segmented % 66 %      Immature Grans % 0 %      Lymphocytes % 24 %      Monocytes % 7 %      Eosinophils Relative 2 %      Basophils Relative 1 %      Absolute Neutrophils 5.59 Thousands/µL      Absolute Immature Grans 0.02 Thousand/uL      Absolute Lymphocytes 2.05 Thousands/µL      Absolute Monocytes 0.56 Thousand/µL      Eosinophils Absolute 0.14 Thousand/µL      Basophils Absolute 0.07 Thousands/µL             XR chest portable    (Results Pending)       Intubation    Date/Time: 7/8/2025 12:44 AM    Performed by: Moisés Pratt DO  Authorized by: Moisés Pratt DO    Patient location:  ED  Other Assisting Provider: No    Consent:     Consent obtained:  Emergent situation  Universal protocol:     Patient identity confirmed:  Arm band  Pre-procedure details:     Patient status:  Unresponsive    Mallampati score:  2    Pretreatment medications:  Etomidate    Paralytics:  Succinylcholine  Indications:     Indications for intubation:  respiratory failure and airway protection    Procedure details:     Preoxygenation:  Bag valve mask    CPR in progress: no      Intubation method:  Oral    Oral intubation technique:  Glidescope    Laryngoscope blade:  Mac 3    Tube size (mm):  7.0    Tube type:  Cuffed    Number of attempts:  1    Ventilation between attempts: no      Cricoid pressure: no      Tube visualized through cords: yes    Placement assessment:     ETT to lip:  24    Tube secured with:  ETT hightower    Breath sounds:  Equal    Placement verification: chest rise, condensation, CXR verification, direct visualization, equal breath sounds, ETCO2 detector and tube exhalation      CXR findings:  ETT in right main stem    Tube repositioned: yes    Post-procedure details:     Patient tolerance of procedure:  Tolerated well, no immediate complications    Critical Care Time Statement: Upon my evaluation, this patient had a high probability of imminent or life-threatening deterioration due to cardiac arrest, bradycardia,, which required my direct attention, intervention, and personal management.  I spent a total of 75 minutes directly providing critical care services, including interpretation of complex medical databases, evaluating for the presence of life-threatening injuries or illnesses, management of organ system failure(s) , complex medical decision making (to support/prevent further life-threatening deterioration)., interpretation of hemodynamic data, titration of vasoactive medications, titration of continuous IV medications (drips), and ventilator management. This time is exclusive of procedures, teaching, treating other patients, family meetings, and any prior time recorded by providers other than myself.       ED Medication and Procedure Management   Prior to Admission Medications   Prescriptions Last Dose Informant Patient Reported? Taking?   aspirin 81 mg chewable tablet Past Week  Yes Yes   Sig: Chew 81 mg in the morning.   gabapentin  (NEURONTIN) 100 mg capsule Past Week  Yes Yes   lisinopril (ZESTRIL) 10 mg tablet Past Week  No Yes   Sig: Take 0.5 tablets (5 mg total) by mouth daily   metoprolol tartrate (LOPRESSOR) 25 mg tablet   No No   Sig: Take 1 tablet (25 mg total) by mouth every 12 (twelve) hours   multivitamin (THERAGRAN) TABS Past Week  Yes Yes   Sig: Take 1 tablet by mouth in the morning.      Facility-Administered Medications: None     Patient's Medications   Discharge Prescriptions    No medications on file     No discharge procedures on file.  ED SEPSIS DOCUMENTATION              [1]   Past Medical History:  Diagnosis Date    Arthritis     Diabetes mellitus (HCC)     Hypertension    [2]   Past Surgical History:  Procedure Laterality Date    HYSTERECTOMY     [3] No family history on file.  [4]   Social History  Tobacco Use    Smoking status: Never    Smokeless tobacco: Never   Vaping Use    Vaping status: Never Used   Substance Use Topics    Alcohol use: Yes     Alcohol/week: 0.0 standard drinks of alcohol    Drug use: No        Moisés Pratt DO  07/08/25 0046

## 2025-07-08 NOTE — ASSESSMENT & PLAN NOTE
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

## 2025-07-08 NOTE — QUICK NOTE
Interval Progress Note - Critical Care   Lindsaybob Crouch 79 y.o. female MRN: [id number]  Unit/Bed#: ICU 07-01 Encounter: [id number]    Acute kidney injury with ATN secondary to shock state  Monitor renal indices closely  Avoid nephrotoxic drugs  Transaminitis secondary to shock state  Monitor LFTs  Continue volume resuscitation      ODIN Monzon

## 2025-07-08 NOTE — H&P
"H&P - Critical Care/ICU   Name: Lindsay Crouch 79 y.o. female I MRN: [id number]  Unit/Bed#: ICU 07-01 I Date of Admission: 7/7/2025   Date of Service: 7/8/2025 I Hospital Day: 1       Assessment & Plan  Bradycardic cardiac arrest (HCC)  Pt presented to the ED with c/o generalized weakness, body aches, fatigue, and dyspnea over the last few days. Per chart, pt stated that she had run out of her meds about a week ago but got a refill for them on 7/4 and has been taking them since.   Initial vitals in the ED were HR 52 and ; pt was A&O x3. While in the ED, pt became more bradycardic and blood pressures became softer. Pt was given a dose of Atropine 0.5 mg, however the bradycardia persisted and pt was to be started on an Epi gtt. Unfortunately, prior to the Epi gtt being started, pt had a bradycardic arrest. CPR was started and ROSC was obtained shortly after; pt received 1 amp of Epi. Pt was intubated during arrest. HR and BP noted to improve; pt was started on Epi gtt.   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 Pt with new DEMETRIUS, on multiple cardiac medications, shock state due to initial hypotension, and an elevated K level of 5.3  Spoke with Cardiology and plan was to continue Epi gtt and IVF for volume    Plan:  Hold home medication regimen  Continue Epi gtt and wean as tolerated  IVF  ECHO pending  Trend Troponins; remain negative thus far  Continuous cardiac monitoring  Cardiology consulted  Replete electrolytes and needed  History of cardiac arrest  Per pt's chart, pt stated that she previously had a \"heart arrest\" > 40 years ago following surgery for a hysterectomy. There is no noted documentation of the arrest noted.   Denies any stents or heart sx  Stress test completed in 2018 was negative for ischemia. Repeat stress test completed in 2021 was also negative.    Hypertension  Plan:  Hold home regimen of Nebivolol 5 mg daily, Lisinopril 20 mg BID, Verapamil 120 mg TID, and HCTZ 25 mg every other " day  Consult Cardiology for medication adjustments  Controlled type 2 diabetes mellitus, without long-term current use of insulin (HCC)  Lab Results   Component Value Date    HGBA1C 6.3 (H) 2025       Recent Labs     25  0219 25  0220 25  0304   POCGLU 317* 279* 321*       Blood Sugar Average: Last 72 hrs:  (P) [id number]  Pt does not appear to be on any type of anti-hyperglycemic agents as an outpatient    Plan:  Q6h SSI and Accuchecks while NPO  Hold home Gabapentin while NPO  Hypoglycemia protocol    DEMETRIUS (acute kidney injury) (HCC)  Baseline Creatinine appears to be 1.0-1.1; noted to be 2.19 on admission  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    Plan:  Gentle IVF   Trend renal indicies  Strict I/O  Avoid nephrotoxins and hypotension  Arthritis  Plan:  Hold home Celebrex while NPO  Acute respiratory failure with hypercapnia ([facility])  Pt intubated during cardiac arrest  Post VB.008/68.8/87.4/16.9    Plan:  Maintain vent and adjust settings as needed  VAP precautions  Daily SAT/SBT as medically appropriate  Propofol and Fentanyl gtts while intubated  Hyperkalemia  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 with a pmhx of DM, arthritis, htn, who presented to the ED with c/o generalized weakness, body aches, fatigue, and dyspnea over the last few days. Per chart, pt stated that she had run out of her meds about a week ago but got a refill for them on  and has been taking them since. Initial vitals in the ED were HR 52 and ; pt was A&O x3. While in the ED, pt became more bradycardic and blood pressures became softer. Pt was given a dose of Atropine 0.5 mg, however the bradycardia persisted and pt was to be started on an Epi gtt. Unfortunately, prior to the Epi gtt being started, pt had a bradycardic arrest. CPR was started and ROSC was obtained  shortly after; pt received 1 amp of Epi. Pt was intubated during arrest. HR and BP noted to improve; pt was started on Epi gtt. 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 Pt with new DEMETRIUS, on multiple cardiac medications, shock state due to initial hypotension, and an elevated K level of 5.3. Spoke with Cardiology and plan was to continue Epi gtt and IVF for volume. Pt admitted to CC service for further work-up and management.        History obtained from chart review.  Review of Systems: Review of Systems   Unable to perform ROS: Intubated       Historical Information   Past Medical History:  No date: Arthritis  No date: Diabetes mellitus (HCC)  No date: Hypertension Past Surgical History:  No date: HYSTERECTOMY   Current Outpatient Medications   Medication Instructions    aspirin 81 mg, Daily    celecoxib (CELEBREX) 200 mg, Oral, 2 times daily    gabapentin (NEURONTIN) 300 mg capsule 1 capsule, 4 times daily    hydroCHLOROthiazide 25 mg, Every other day    lisinopril (ZESTRIL) 20 mg tablet 1 tablet, 2 times daily    meloxicam (MOBIC) 15 mg tablet 1 tablet, Oral, Daily    metoprolol tartrate (LOPRESSOR) 25 mg, Oral, Every 12 hours scheduled    multivitamin (THERAGRAN) TABS 1 tablet, Daily    nebivolol (BYSTOLIC) 5 mg, Oral, Daily    verapamil (CALAN) 120 mg, Oral, 3 times daily    Allergies[1]   Social History[2] Family History[3]       Objective :                   Vitals I/O      Most Recent Min/Max in 24hrs   Temp (!) 97.3 °F (36.3 °C) Temp  Min: 97.3 °F (36.3 °C)  Max: 97.9 °F (36.6 °C)   Pulse 83 Pulse  Min: 32  Max: 90   Resp 16 Resp  Min: 15  Max: 22   /61 BP  Min: 84/51  Max: 197/84   O2 Sat 100 % SpO2  Min: 82 %  Max: 100 %      Intake/Output Summary (Last 24 hours) at 7/8/2025 0315  Last data filed at 7/8/2025 0211  Gross per 24 hour   Intake 1087.93 ml   Output 200 ml   Net 887.93 ml       Diet NPO    Invasive Monitoring           Physical Exam   Physical Exam  Vitals and nursing note reviewed.    Eyes:      Pupils: Pupils are equal, round, and reactive to light.   Skin:     General: Skin is warm and dry.      Capillary Refill: Capillary refill takes less than 2 seconds.   HENT:      Head: Normocephalic.      Mouth/Throat:      Mouth: Mucous membranes are moist.      Comments: OGT in place  Cardiovascular:      Rate and Rhythm: Normal rate and regular rhythm.      Pulses: Normal pulses.      Heart sounds: Normal heart sounds.   Abdominal: General: Bowel sounds are normal.      Palpations: Abdomen is soft.   Constitutional:       Appearance: She is ill-appearing.      Interventions: She is sedated, intubated and restrained.   Pulmonary:      Effort: She is intubated.      Breath sounds: Normal breath sounds.   Genitourinary/Anorectal:  [place] present.        Diagnostic Studies        Lab Results: I have reviewed the following results:     Medications:  Scheduled PRN   chlorhexidine, 15 mL, Q12H SOUTH  heparin (porcine), 5,000 Units, Q8H SOUTH  insulin lispro, 1-6 Units, Q6H SOUTH  multi-electrolyte, 1,000 mL, Once  pantoprazole, 40 mg, Q24H SOUTH      fentaNYL, 50 mcg, Q2H PRN       Continuous    epinephrine, 1-10 mcg/min, Last Rate: 4 mcg/min (07/08/25 0211)  fentaNYL, 50 mcg/hr, Last Rate: 50 mcg/hr (07/08/25 0140)  propofol, 5-50 mcg/kg/min         Labs:   CBC    Recent Labs     07/07/25 2214 07/08/25  0023   WBC 8.43 12.26*   HGB 11.5 11.3*   HCT 36.6 36.5    236     BMP    Recent Labs     07/07/25 2214 07/08/25  0023   SODIUM 134* 133*   K 5.3 5.5*    103   CO2 21 18*   AGAP 11 12   BUN 59* 58*   CREATININE 2.10* 2.19*   CALCIUM 8.4 8.0*       Coags    Recent Labs     07/08/25  0023   INR 1.27*   PTT 26        Additional Electrolytes  Recent Labs     07/08/25  0023   MG 2.5   PHOS 4.2*   CAIONIZED 1.09*          Blood Gas    No recent results  Recent Labs     07/08/25  0023   PHVEN 7.008*   QIU0FEG 68.8*   PO2VEN 84.7*   MFG9IHL 16.9*   BEVEN -14.8   T3BARKO 89.7*    LFTs  Recent Labs      07/07/25 2214 07/08/25  0023   ALT 50 64*   AST 66* 88*   ALKPHOS 88 86   ALB 3.8 3.8   TBILI 0.52 0.47       Infectious  No recent results  Glucose  Recent Labs     07/07/25 2214 07/08/25  0023   GLUC 257* 296*        Administrative Statements   Critical Care Time Statement: Upon my evaluation, this patient had a high probability of imminent or life-threatening deterioration due to Bradycardia arrest, DEMETRIUS, electrolyte imbalance, which required my direct attention, intervention, and personal management.  I spent a total of 65 minutes directly providing critical care services, including interpretation of complex medical databases, evaluating for the presence of life-threatening injuries or illnesses, management of organ system failure(s) , complex medical decision making (to support/prevent further life-threatening deterioration)., interpretation of hemodynamic data, titration of vasoactive medications, titration of continuous IV medications (drips), and ventilator management. This time is exclusive of procedures, teaching, family meetings, and any prior time recorded by providers other than myself.           [1] No Known Allergies  [2]   Social History  Tobacco Use    Smoking status: Never    Smokeless tobacco: Never   Vaping Use    Vaping status: Never Used   Substance Use Topics    Alcohol use: Yes     Alcohol/week: 0.0 standard drinks of alcohol    Drug use: No   [3] No family history on file.

## 2025-07-08 NOTE — ASSESSMENT & PLAN NOTE
This may be related to shock like presentation or relative volume depletion precipitating the presentation itself.  In any event volume repletion is underway.

## 2025-07-08 NOTE — ASSESSMENT & PLAN NOTE
Was on metoprolol and verapamil.  These will be held.  Unclear whether meds were part of precipitating the events.

## 2025-07-08 NOTE — RESPIRATORY THERAPY NOTE
07/08/25 0701   Vent Information   Vent type     Vent Mode AC/VC   SpO2 100 %   AC/VC Settings   Resp Rate (BPM) 16 BPM   Vt (mL) 500 mL   FIO2 (%) (S)  70 %  (Decreased to 60 % as per critical care)   PEEP (cmH2O) (S)  8 cmH2O  (Decrased to 6 as per Critcal care)   Flow Pattern (LPM) 60 L/min   Trigger Sensitivity Flow (lpm) 3 %   Humidification Heater   Heater Temperature (Set) 98.6 °F (37 °C)   AC/VC Actuals   Resp Rate (BPM) 16 BPM   VT (mL) 547   MV 8.82   MAP (cmH2O) 13 cmH2O   Peak Pressure (cmH2O) 33 cmH2O   I/E Ratio (Obs) 1:3.2   Heater Temperature (Obs) 98.6 °F (37 °C)   Static Compliance (mL/cmH20) 41 mL/cmH2O   Plateau Pressure (cm H2O) 20 cm H2O   AC/VC Alarms   High Peak Pressure (cmH2O) 60   High Resp Rate (BPM) (S)  40 BPM   High MV (L/min) 20 L/min   Low MV (L/min) 4 L/min   Vt High (mL) 1000 mL   Vt Low (mL) (S)  400 mL   AC/VC Apnea Settings   Resp Rate (BPM) 16 BPM   VT (mL) 500 mL   FIO2 (%) 100 %   Apnea Time (s) 20 S   Apnea Flow (L/min) 60 L/min   Daily Screen   Patient safety screen outcome: Passed

## 2025-07-08 NOTE — PLAN OF CARE
Problem: SAFETY,RESTRAINT: NV/NON-SELF DESTRUCTIVE BEHAVIOR  Goal: Remains free of harm/injury (restraint for non violent/non self-detsructive behavior)  Description: INTERVENTIONS:  - Instruct patient/family regarding restraint use   - Assess and monitor physiologic and psychological status   - Provide interventions and comfort measures to meet assessed patient needs   - Identify and implement measures to help patient regain control  - Assess readiness for release of restraint   Outcome: Progressing     Problem: Prexisting or High Potential for Compromised Skin Integrity  Goal: Skin integrity is maintained or improved  Description: INTERVENTIONS:  - Identify patients at risk for skin breakdown  - Assess and monitor skin integrity including under and around medical devices   - Assess and monitor nutrition and hydration status  - Monitor labs  - Assess for incontinence   - Turn and reposition patient  - Assist with mobility/ambulation  - Relieve pressure over saadia prominences   - Avoid friction and shearing  - Provide appropriate hygiene as needed including keeping skin clean and dry  - Evaluate need for skin moisturizer/barrier cream  - Collaborate with interdisciplinary team  - Patient/family teaching  - Consider wound care consult    Assess:  - Review Cliff scale daily  - Clean and moisturize skin every shift  - Inspect skin when repositioning, toileting, and assisting with ADLS  - Assess under medical devices  - Assess extremities for adequate circulation and sensation     Bed Management:  - Have minimal linens on bed & keep smooth, unwrinkled  - Change linens as needed when moist or perspiring  - Avoid sitting or lying in one position for more than 2 hours while in bed?Keep HOB at 30 degrees   - Toileting:  - Offer bedside commode  - Assess for incontinence every hour  - Use incontinent care products after each incontinent episode    Activity:  - Encourage or provide ROM exercises   - Turn and reposition  patient every 2 Hours  - Use appropriate equipment to lift or move patient in bed    Skin Care:  - Avoid use of baby powder, tape, friction and shearing, hot water or constrictive clothing  - Relieve pressure over bony prominences  - Do not massage red bony areas    Next Steps:  - Teach patient strategies to minimize risks  - Consider consults to  interdisciplinary teams  Outcome: Progressing

## 2025-07-08 NOTE — ASSESSMENT & PLAN NOTE
Plan:  Hold home regimen of Nebivolol 5 mg daily, Lisinopril 20 mg BID, Verapamil 120 mg TID, and HCTZ 25 mg every other day  Consult Cardiology for medication adjustments

## 2025-07-08 NOTE — ASSESSMENT & PLAN NOTE
Pt intubated during cardiac arrest  Post VB.008/68.8/87.4/16.9    Plan:  Maintain vent and adjust settings as needed  VAP precautions  Daily SAT/SBT as medically appropriate  Propofol and Fentanyl gtts while intubated   Refill on norco 5-325mg    wg/bur

## 2025-07-08 NOTE — PLAN OF CARE
Problem: SAFETY,RESTRAINT: NV/NON-SELF DESTRUCTIVE BEHAVIOR  Goal: Remains free of harm/injury (restraint for non violent/non self-detsructive behavior)  Description: INTERVENTIONS:  - Instruct patient/family regarding restraint use   - Assess and monitor physiologic and psychological status   - Provide interventions and comfort measures to meet assessed patient needs   - Identify and implement measures to help patient regain control  - Assess readiness for release of restraint   Outcome: Progressing  Goal: Returns to optimal restraint-free functioning  Description: INTERVENTIONS:  - Assess the patient's behavior and symptoms that indicate continued need for restraint  - Identify and implement measures to help patient regain control  - Assess readiness for release of restraint   Outcome: Progressing     Problem: Prexisting or High Potential for Compromised Skin Integrity  Goal: Skin integrity is maintained or improved  Description: INTERVENTIONS:  - Identify patients at risk for skin breakdown  - Assess and monitor skin integrity including under and around medical devices   - Assess and monitor nutrition and hydration status  - Monitor labs  - Assess for incontinence   - Turn and reposition patient  - Assist with mobility/ambulation  - Relieve pressure over saadia prominences   - Avoid friction and shearing  - Provide appropriate hygiene as needed including keeping skin clean and dry  - Evaluate need for skin moisturizer/barrier cream  - Collaborate with interdisciplinary team  - Patient/family teaching  - Consider wound care consult    Assess:  - Review Cliff scale daily  - Clean and moisturize skin every shift  - Inspect skin when repositioning, toileting, and assisting with ADLS  - Assess under medical devices  - Assess extremities for adequate circulation and sensation     Bed Management:  - Have minimal linens on bed & keep smooth, unwrinkled  - Change linens as needed when moist or perspiring  - Avoid  sitting or lying in one position for more than 2 hours while in bed?Keep HOB at 30 degrees   - Toileting:  - Offer bedside commode  - Assess for incontinence every hour  - Use incontinent care products after each incontinent episode    Activity:  - Encourage or provide ROM exercises   - Turn and reposition patient every 2 Hours  - Use appropriate equipment to lift or move patient in bed    Skin Care:  - Avoid use of baby powder, tape, friction and shearing, hot water or constrictive clothing  - Relieve pressure over bony prominences  - Do not massage red bony areas    Next Steps:  - Teach patient strategies to minimize risks  - Consider consults to  interdisciplinary teams  Outcome: Progressing     Problem: PAIN - ADULT  Goal: Verbalizes/displays adequate comfort level or baseline comfort level  Description: Interventions:  - Encourage patient to monitor pain and request assistance  - Assess pain using appropriate pain scale  - Administer analgesics as ordered based on type and severity of pain and evaluate response  - Implement non-pharmacological measures as appropriate and evaluate response  - Consider cultural and social influences on pain and pain management  - Notify physician/advanced practitioner if interventions unsuccessful or patient reports new pain  - Educate patient/family on pain management process including their role and importance of  reporting pain   - Provide non-pharmacologic/complimentary pain relief interventions  Outcome: Progressing     Problem: INFECTION - ADULT  Goal: Absence or prevention of progression during hospitalization  Description: INTERVENTIONS:  - Assess and monitor for signs and symptoms of infection  - Monitor lab/diagnostic results  - Monitor all insertion sites, i.e. indwelling lines, tubes, and drains  - Monitor endotracheal if appropriate and nasal secretions for changes in amount and color  - [place] appropriate cooling/warming therapies per order  - Administer  medications as ordered  - Instruct and encourage patient and family to use good hand hygiene technique  - Identify and instruct in appropriate isolation precautions for identified infection/condition  Outcome: Progressing  Goal: Absence of fever/infection during neutropenic period  Description: INTERVENTIONS:  - Monitor WBC  - Perform strict hand hygiene  - Limit to healthy visitors only  - No plants, dried, fresh or silk flowers with dillon in patient room  Outcome: Progressing     Problem: SAFETY ADULT  Goal: Patient will remain free of falls  Description: INTERVENTIONS:  - Educate patient/family on patient safety including physical limitations  - Instruct patient to call for assistance with activity   - Consider consulting OT/PT to assist with strengthening/mobility based on AM PAC & JH-HLM score  - Consult OT/PT to assist with strengthening/mobility   - Keep Call bell within reach  - Keep bed low and locked with side rails adjusted as appropriate  - Keep care items and personal belongings within reach  - Initiate and maintain comfort rounds  - Make Fall Risk Sign visible to staff  - Offer Toileting every 2 Hours, in advance of need  - Initiate/Maintain bed alarm  - Obtain necessary fall risk management equipment  - Apply yellow socks and bracelet for high fall risk patients  - Consider moving patient to room near nurses station  Outcome: Progressing  Goal: Maintain or return to baseline ADL function  Description: INTERVENTIONS:  -  Assess patient's ability to carry out ADLs; assess patient's baseline for ADL function and identify physical deficits which impact ability to perform ADLs (bathing, care of mouth/teeth, toileting, grooming, dressing, etc.)  - Assess/evaluate cause of self-care deficits   - Assess range of motion  - Assess patient's mobility; develop plan if impaired  - Assess patient's need for assistive devices and provide as appropriate  - Encourage maximum independence but intervene and supervise  when necessary  - Involve family in performance of ADLs  - Assess for home care needs following discharge   - Consider OT consult to assist with ADL evaluation and planning for discharge  - Provide patient education as appropriate  - Monitor functional capacity and physical performance, use of AM PAC & JH-HLM   - Monitor gait, balance and fatigue with ambulation    Outcome: Progressing  Goal: Maintains/Returns to pre admission functional level  Description: INTERVENTIONS:  - Perform AM-PAC 6 Click Basic Mobility/ Daily Activity assessment daily.  - Set and communicate daily mobility goal to care team and patient/family/caregiver.   - Collaborate with rehabilitation services on mobility goals if consulted  - Perform Range of Motion 3 times a day.  - Reposition patient every 2 hours.  - Record patient progress and toleration of activity level   Outcome: Progressing     Problem: DISCHARGE PLANNING  Goal: Discharge to home or other facility with appropriate resources  Description: INTERVENTIONS:  - Identify barriers to discharge w/patient and caregiver  - Arrange for needed discharge resources and transportation as appropriate  - Identify discharge learning needs (meds, wound care, etc.)  - Arrange for interpretive services to assist at discharge as needed  - Refer to Case Management Department for coordinating discharge planning if the patient needs post-hospital services based on physician/advanced practitioner order or complex needs related to functional status, cognitive ability, or social support system  Outcome: Progressing     Problem: Knowledge Deficit  Goal: Patient/family/caregiver demonstrates understanding of disease process, treatment plan, medications, and discharge instructions  Description: Complete learning assessment and assess knowledge base.  Interventions:  - Provide teaching at level of understanding  - Provide teaching via preferred learning methods  Outcome: Progressing

## 2025-07-08 NOTE — CASE MANAGEMENT
Case Management Assessment & Discharge Planning Note    Patient name Lindsay Crouch  Location ICU /ICU  MRN [id number]  : 1945 Date 2025       Current Admission Date: 2025  Current Admission Diagnosis:Bradycardic cardiac arrest (HCC)   Patient Active Problem List    Diagnosis Date Noted    Arthritis 2025    Bradycardic cardiac arrest (HCC) 2025    Acute respiratory failure with hypercapnia (HCC) 2025    Hyperkalemia 2025    Nonrheumatic aortic valve stenosis 2025    Paresthesia of skin     Radiculopathy, lumbar region     DEMETRIUS (acute kidney injury) (HCC) 2021    PAC (premature atrial contraction) 2021    Chest pain 2021    History of cardiac arrest 2021    Hypertension 2021    Controlled type 2 diabetes mellitus, without long-term current use of insulin (HCC) 2021      LOS (days): 1  Geometric Mean LOS (GMLOS) (days): 4.9  Days to GMLOS:4.4     OBJECTIVE:    Risk of Unplanned Readmission Score: 18.2         Current admission status: Inpatient       Preferred Pharmacy: No Pharmacies Listed  Primary Care Provider: Rissa Chino DO    Primary Insurance: MEDICARE  Secondary Insurance:     ASSESSMENT:  Active Health Care Proxies    There are no active Health Care Proxies on file.       Readmission Root Cause  30 Day Readmission: No    Patient Information  Admitted from:: Home  Mental Status: Intubated  During Assessment patient was accompanied by: Son (Son Yury provided all information)  Assessment information provided by:: Son  Primary Caregiver: Self  Support Systems: Family members  County of Residence: [facility] do you live in?: [place]  Home entry access options. Select all that apply.: Stairs  Number of steps to enter home.: 5  Do the steps have railings?: Yes  Type of Current Residence: [address] [place] [address] home  Upon entering residence, is there a bedroom on the main floor (no further steps)?: No  A bedroom is  located on the following floor levels of residence (select all that apply):: 2nd Floor  Upon entering residence, is there a bathroom on the main floor (no further steps)?: No  Indicate which floors of current residence have a bathroom (select all the apply):: 2nd Floor  Number of steps to 2nd floor from main floor: One Flight  Living Arrangements: Lives w/ Daughter  Is patient a ?: No    Activities of Daily Living Prior to Admission  Functional Status: Independent  Completes ADLs independently?: Yes  Ambulates independently?: Yes  Does patient use assisted devices?: No  Does patient currently own DME?: Yes  What DME does the patient currently own?: Walker, Straight Cane, Rollator  Does patient have a history of Outpatient Therapy (PT/OT)?: No  Does the patient have a history of Short-Term Rehab?: No  Does patient have a history of HHC?: No  Does patient currently have HHC?: No    Patient Information Continued  Income Source: Pension/senior living  Does patient have prescription coverage?: Yes  Can the patient afford their medications and any related supplies (such as glucometers or test strips)?: Yes  Does patient receive dialysis treatments?: No  Does patient have a history of substance abuse?: No  Does patient have a history of Mental Health Diagnosis?: No    Means of Transportation  Means of Transport to Appts:: Drives Self    DISCHARGE DETAILS:    Discharge planning discussed with:: Spoke with susy Cotton at the bedside  Contacts  Patient Contacts: Nicki Crouch daughter  Relationship to Patient:: Family  Contact Method: Phone  Phone Number: [phone]  Reason/Outcome: Emergency Contact  Pt is currently vented.  Spoke to the pt susy Cotton at the bedside.  Pt lives with her daughter Katie in a 2 story home.  Prior to admission the pt was IPA and was able to drive.  Will follow for CM needs when extubated.

## 2025-07-08 NOTE — CONSULTS
Consultation - Cardiology   Name: Lindsay Crouch 79 y.o. female I MRN: [id number]  Unit/Bed#: ICU 07-01 I Date of Admission: 7/7/2025   Date of Service: 7/8/2025 I Hospital Day: 1   Inpatient consult to Cardiology  Consult performed by: Dayo Putnam MD  Consult ordered by: ODIN Ely        Physician Requesting Evaluation: Obey Pryor MD   Reason for Evaluation / Principal Problem: See below    Assessment & Plan  Bradycardic cardiac arrest (HCC)  Was on metoprolol and verapamil.  These will be held.  Unclear whether meds were part of precipitating the events.  Hypertension  Meds on hold at this point related to relative hypotension and volume depletion.  I do not anticipate restarting verapamil and/or metoprolol.  DEMETRIUS (acute kidney injury) (HCC)  This may be related to shock like presentation or relative volume depletion precipitating the presentation itself.  In any event volume repletion is underway.  Nonrheumatic aortic valve stenosis  Very mild.    Other summary comments:   Will take another history after patient is extubated of course.  I do not believe acute coronary process explains presentation at this point however.    Outpatient Cardiologist: None thus far    HPI: Lindsay Crouch is a 79 y.o. year old female who presented with with weakness.  This had been present for at least 24 hours prior to presentation by description of her son who came to visit the day before.  Upon arrival blood pressure was on the low side as well as pulse rate.  This deteriorated further in our ED culminating in bradycardia and intubation.  Overnight she has been getting some volume as well as epinephrine.  Heart rate was junctional for a bit but then has been in sinus rhythm most recently with a reasonable rate.  There is no difficulty with oxygenation.  Troponins have been negative.  BNP is only 450 this morning.    By description patient has had some difficulty with walking related to neuropathy.   "No history of exertional chest pain.  No syncope or near syncope.  There is a longstanding history of diabetes non-insulin-dependent.    EKG:   Currently sinus rhythm with nonspecific ST segment changes.    MOST  RECENT CARDIAC IMAGING:   TTE 2025: Normal LV systolic function.  Mild LVH.  Very mild aortic stenosis.      Review of Systems: Not obtainable    Historical Information   Past Medical History[1]  Past Surgical History[2]  Social History     Substance and Sexual Activity   Alcohol Use Yes    Alcohol/week: 0.0 standard drinks of alcohol     Social History     Substance and Sexual Activity   Drug Use No     Tobacco Use History[3]    Family History:   No longer relevant.    Meds/Allergies   all current active meds have been reviewed    Medications Prior to Admission:     aspirin 81 mg chewable tablet    celecoxib (CeleBREX) 200 mg capsule    gabapentin (NEURONTIN) 300 mg capsule    hydroCHLOROthiazide 25 mg tablet    lisinopril (ZESTRIL) 20 mg tablet    meloxicam (MOBIC) 15 mg tablet    metoprolol tartrate (LOPRESSOR) 25 mg tablet    multivitamin (THERAGRAN) TABS    nebivolol (BYSTOLIC) 5 mg tablet    verapamil (CALAN) 120 mg tablet    Allergies[4]    Objective   Vitals: Blood pressure 97/51, pulse 67, temperature 98.4 °F (36.9 °C), resp. rate 16, height 5' 4\" (1.626 m), weight 84.4 kg (186 lb), SpO2 99%., Body mass index is 31.93 kg/m².,   Orthostatic Blood Pressures      Flowsheet Row Most Recent Value   Blood Pressure 97/51 filed at 2025 1000   Patient Position - Orthostatic VS Lying filed at 2025 0930            Systolic (24hrs), Av , Min:84 , Max:197     Diastolic (24hrs), Av, Min:49, Max:84              Physical Exam:    General:  Normal appearance in no distress.  Eyes:  Anicteric.  Oral mucosa:  Moist.  Neck:  No JVD. Carotid upstrokes are brisk without bruits.  No masses.  Chest:  Clear to auscultation.  Cardiac:  No palpable PMI.  Normal S1 and S2.  Grade 1 systolic murmur at " the base.  No gallop or rub.  Abdomen:  Soft and nontender. No palpable organomegaly or aortic enlargement.  Extremities:  No peripheral edema.  Musculoskeletal:  Symmetric.   Vascular:  Femoral pulses are brisk without bruits.  Popliteal  pulses are intact bilaterally.  Pedal pulses are intact.  Neuro:  Grossly symmetric.  Psych: Intubated      Lab Results:   Labs reviewed and prominent abnormalities reviewed above and/or below.    Troponins:    Results from last 7 days   Lab Units 07/08/25  0543 07/08/25  0418 07/08/25  0208   HS TNI 0HR ng/L  --   --  11   HS TNI 2HR ng/L  --  16  --    HSTNI D2 ng/L  --  5  --    HS TNI 4HR ng/L 17  --   --    HSTNI D4 ng/L 6  --   --      BNP:   Results from last 6 Months   Lab Units 07/08/25  0426 07/07/25  2214   BNP pg/mL 450* 551*                    [1]   Past Medical History:  Diagnosis Date    Arthritis     Diabetes mellitus (HCC)     Hypertension    [2]   Past Surgical History:  Procedure Laterality Date    HYSTERECTOMY     [3]   Social History  Tobacco Use   Smoking Status Never   Smokeless Tobacco Never   [4] No Known Allergies

## 2025-07-08 NOTE — RESPIRATORY THERAPY NOTE
"RT Protocol Note  Lindsay Crouch 79 y.o. female MRN: [id number]  Unit/Bed#: ICU 07-01 Encounter: [id number]    Assessment    Principal Problem:    Bradycardic cardiac arrest (HCC)  Active Problems:    History of cardiac arrest    Hypertension    Controlled type 2 diabetes mellitus, without long-term current use of insulin (HCC)    DEMETRIUS (acute kidney injury) (HCC)    Arthritis    Acute respiratory failure with hypercapnia (HCC)    Hyperkalemia      Home Pulmonary Medications:  None    Home Devices/Therapy: Other (Comment) (None noted in chart)    Past Medical History[1]  Social History[2]    Subjective         Objective    Physical Exam:   Assessment Type: Assess only  General Appearance: Sedated  Respiratory Pattern: Assisted  Chest Assessment: Chest expansion symmetrical  Bilateral Breath Sounds: Clear, Diminished  Cough: None    Vitals:  Blood pressure 99/51, pulse 72, temperature 98.2 °F (36.8 °C), temperature source Bladder, resp. rate 16, height 5' 4\" (1.626 m), weight 84.4 kg (186 lb), SpO2 98%.    Results from last 7 days   Lab Units 07/08/25  0454   PH ART  7.338*   PCO2 ART mm Hg 32.8*   PO2 ART mm Hg 350.5*   HCO3 ART mmol/L 17.2*   BASE EXC ART mmol/L -7.6   O2 CONTENT ART mL/dL 17.2   O2 HGB, ARTERIAL % 98.2*   ABG SOURCE  Brachial, Right       Imaging and other studies:           Plan    Respiratory Plan: Vent/NIV/HFNC        Resp Comments: Patient is vented. She has no pulmonary medcine list on her med rec.No bronchodilator ordered at this time.  Will continue to monitor the patient.        [1]   Past Medical History:  Diagnosis Date    Arthritis     Diabetes mellitus (HCC)     Hypertension    [2]   Social History  Socioeconomic History    Marital status:    Tobacco Use    Smoking status: Never    Smokeless tobacco: Never   Vaping Use    Vaping status: Never Used   Substance and Sexual Activity    Alcohol use: Yes     Alcohol/week: 0.0 standard drinks of alcohol    Drug use: No    Sexual " activity: Not Currently     Social Drivers of Health     Food Insecurity: Patient Unable To Answer (7/8/2025)    Nursing - Inadequate Food Risk Classification     Ran Out of Food in the Last Year: Patient unable to answer   Transportation Needs: Patient Unable To Answer (7/8/2025)    Nursing - Transportation Risk Classification     Lack of Transportation: Patient unable to answer   Intimate Partner Violence: Patient Unable To Answer (7/8/2025)    Nursing IPS     Physically Hurt by Someone: Patient unable to answer     Hurt or Threatened by Someone: Patient unable to answer   Housing Stability: Patient Unable To Answer (7/8/2025)    Nursing: Inadequate Housing Risk Classification     Unable to Pay for Housing in the Last Year: Patient unable to answer     Has Housing: Patient unable to answer

## 2025-07-08 NOTE — ASSESSMENT & PLAN NOTE
Pt presented to the ED with c/o generalized weakness, body aches, fatigue, and dyspnea over the last few days. Per chart, pt stated that she had run out of her meds about a week ago but got a refill for them on 7/4 and has been taking them since.   Initial vitals in the ED were HR 52 and ; pt was A&O x3. While in the ED, pt became more bradycardic and blood pressures became softer. Pt was given a dose of Atropine 0.5 mg, however the bradycardia persisted and pt was to be started on an Epi gtt. Unfortunately, prior to the Epi gtt being started, pt had a bradycardic arrest. CPR was started and ROSC was obtained shortly after; pt received 1 amp of Epi. Pt was intubated during arrest. HR and BP noted to improve; pt was started on Epi gtt.   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 Pt with new DEMETRIUS, on multiple cardiac medications, shock state due to initial hypotension, and an elevated K level of 5.3  Spoke with Cardiology and plan was to continue Epi gtt and IVF for volume    Plan:  Hold home medication regimen  Continue Epi gtt and wean as tolerated  IVF  ECHO pending  Trend Troponins; remain negative thus far  Continuous cardiac monitoring  Cardiology consulted  Replete electrolytes and needed

## 2025-07-08 NOTE — ASSESSMENT & PLAN NOTE
"Per pt's chart, pt stated that she previously had a \"heart arrest\" > 40 years ago following surgery for a hysterectomy. There is no noted documentation of the arrest noted.   Denies any stents or heart sx  Stress test completed in 2018 was negative for ischemia. Repeat stress test completed in 2021 was also negative.    "

## 2025-07-08 NOTE — ASSESSMENT & PLAN NOTE
Lab Results   Component Value Date    HGBA1C 6.3 (H) 03/01/2025       Recent Labs     07/08/25  0219 07/08/25  0220 07/08/25  0304   POCGLU 317* 279* 321*       Blood Sugar Average: Last 72 hrs:  (P) [id number]  Pt does not appear to be on any type of anti-hyperglycemic agents as an outpatient    Plan:  Q6h SSI and Accuchecks while NPO  Hold home Gabapentin while NPO  Hypoglycemia protocol

## 2025-07-09 LAB
ALBUMIN SERPL BCG-MCNC: 3.1 G/DL (ref 3.5–5)
ALP SERPL-CCNC: 67 U/L (ref 34–104)
ALT SERPL W P-5'-P-CCNC: 44 U/L (ref 7–52)
ANION GAP SERPL CALCULATED.3IONS-SCNC: 6 MMOL/L (ref 4–13)
AST SERPL W P-5'-P-CCNC: 38 U/L (ref 13–39)
BASOPHILS # BLD AUTO: 0.04 THOUSANDS/ÂΜL (ref 0–0.1)
BASOPHILS NFR BLD AUTO: 1 % (ref 0–1)
BILIRUB SERPL-MCNC: 0.82 MG/DL (ref 0.2–1)
BUN SERPL-MCNC: 46 MG/DL (ref 5–25)
CA-I BLD-SCNC: 1.13 MMOL/L (ref 1.12–1.32)
CALCIUM ALBUM COR SERPL-MCNC: 9.2 MG/DL (ref 8.3–10.1)
CALCIUM SERPL-MCNC: 8.5 MG/DL (ref 8.4–10.2)
CHLORIDE SERPL-SCNC: 105 MMOL/L (ref 96–108)
CO2 SERPL-SCNC: 25 MMOL/L (ref 21–32)
CREAT SERPL-MCNC: 1.55 MG/DL (ref 0.6–1.3)
EOSINOPHIL # BLD AUTO: 0.17 THOUSAND/ÂΜL (ref 0–0.61)
EOSINOPHIL NFR BLD AUTO: 3 % (ref 0–6)
ERYTHROCYTE [DISTWIDTH] IN BLOOD BY AUTOMATED COUNT: 13.2 % (ref 11.6–15.1)
GFR SERPL CREATININE-BSD FRML MDRD: 31 ML/MIN/1.73SQ M
GLUCOSE SERPL-MCNC: 102 MG/DL (ref 65–140)
GLUCOSE SERPL-MCNC: 107 MG/DL (ref 65–140)
GLUCOSE SERPL-MCNC: 111 MG/DL (ref 65–140)
GLUCOSE SERPL-MCNC: 87 MG/DL (ref 65–140)
GLUCOSE SERPL-MCNC: 89 MG/DL (ref 65–140)
HCT VFR BLD AUTO: 31.6 % (ref 34.8–46.1)
HGB BLD-MCNC: 10.1 G/DL (ref 11.5–15.4)
IMM GRANULOCYTES # BLD AUTO: 0.03 THOUSAND/UL (ref 0–0.2)
IMM GRANULOCYTES NFR BLD AUTO: 1 % (ref 0–2)
LYMPHOCYTES # BLD AUTO: 1.27 THOUSANDS/ÂΜL (ref 0.6–4.47)
LYMPHOCYTES NFR BLD AUTO: 22 % (ref 14–44)
MAGNESIUM SERPL-MCNC: 2.2 MG/DL (ref 1.9–2.7)
MCH RBC QN AUTO: 30 PG (ref 26.8–34.3)
MCHC RBC AUTO-ENTMCNC: 32 G/DL (ref 31.4–37.4)
MCV RBC AUTO: 94 FL (ref 82–98)
MONOCYTES # BLD AUTO: 0.48 THOUSAND/ÂΜL (ref 0.17–1.22)
MONOCYTES NFR BLD AUTO: 8 % (ref 4–12)
NEUTROPHILS # BLD AUTO: 3.83 THOUSANDS/ÂΜL (ref 1.85–7.62)
NEUTS SEG NFR BLD AUTO: 65 % (ref 43–75)
NRBC BLD AUTO-RTO: 0 /100 WBCS
PHOSPHATE SERPL-MCNC: 4.2 MG/DL (ref 2.3–4.1)
PLATELET # BLD AUTO: 162 THOUSANDS/UL (ref 149–390)
PMV BLD AUTO: 10.6 FL (ref 8.9–12.7)
POTASSIUM SERPL-SCNC: 5.1 MMOL/L (ref 3.5–5.3)
PROT SERPL-MCNC: 5.3 G/DL (ref 6.4–8.4)
RBC # BLD AUTO: 3.37 MILLION/UL (ref 3.81–5.12)
SODIUM SERPL-SCNC: 136 MMOL/L (ref 135–147)
WBC # BLD AUTO: 5.82 THOUSAND/UL (ref 4.31–10.16)

## 2025-07-09 PROCEDURE — 97166 OT EVAL MOD COMPLEX 45 MIN: CPT

## 2025-07-09 PROCEDURE — 82330 ASSAY OF CALCIUM: CPT | Performed by: INTERNAL MEDICINE

## 2025-07-09 PROCEDURE — 97162 PT EVAL MOD COMPLEX 30 MIN: CPT

## 2025-07-09 PROCEDURE — 99232 SBSQ HOSP IP/OBS MODERATE 35: CPT | Performed by: INTERNAL MEDICINE

## 2025-07-09 PROCEDURE — NC001 PR NO CHARGE: Performed by: NURSE PRACTITIONER

## 2025-07-09 PROCEDURE — 82948 REAGENT STRIP/BLOOD GLUCOSE: CPT

## 2025-07-09 PROCEDURE — 85025 COMPLETE CBC W/AUTO DIFF WBC: CPT | Performed by: INTERNAL MEDICINE

## 2025-07-09 PROCEDURE — 84100 ASSAY OF PHOSPHORUS: CPT | Performed by: INTERNAL MEDICINE

## 2025-07-09 PROCEDURE — 83735 ASSAY OF MAGNESIUM: CPT | Performed by: INTERNAL MEDICINE

## 2025-07-09 PROCEDURE — 80053 COMPREHEN METABOLIC PANEL: CPT | Performed by: INTERNAL MEDICINE

## 2025-07-09 RX ORDER — AMLODIPINE BESYLATE 5 MG/1
5 TABLET ORAL DAILY
Status: DISCONTINUED | OUTPATIENT
Start: 2025-07-09 | End: 2025-07-10 | Stop reason: HOSPADM

## 2025-07-09 RX ORDER — ACETAMINOPHEN 325 MG/1
650 TABLET ORAL EVERY 6 HOURS PRN
Status: DISCONTINUED | OUTPATIENT
Start: 2025-07-09 | End: 2025-07-10 | Stop reason: HOSPADM

## 2025-07-09 RX ORDER — PANTOPRAZOLE SODIUM 40 MG/1
40 TABLET, DELAYED RELEASE ORAL
Status: DISCONTINUED | OUTPATIENT
Start: 2025-07-09 | End: 2025-07-10 | Stop reason: HOSPADM

## 2025-07-09 RX ORDER — INSULIN LISPRO 100 [IU]/ML
1-5 INJECTION, SOLUTION INTRAVENOUS; SUBCUTANEOUS
Status: DISCONTINUED | OUTPATIENT
Start: 2025-07-09 | End: 2025-07-10 | Stop reason: HOSPADM

## 2025-07-09 RX ORDER — ASPIRIN 81 MG/1
81 TABLET, CHEWABLE ORAL DAILY
Status: DISCONTINUED | OUTPATIENT
Start: 2025-07-09 | End: 2025-07-10 | Stop reason: HOSPADM

## 2025-07-09 RX ORDER — AMOXICILLIN 250 MG
2 CAPSULE ORAL
Status: DISCONTINUED | OUTPATIENT
Start: 2025-07-09 | End: 2025-07-10 | Stop reason: HOSPADM

## 2025-07-09 RX ORDER — GABAPENTIN 100 MG/1
100 CAPSULE ORAL 4 TIMES DAILY
Status: DISCONTINUED | OUTPATIENT
Start: 2025-07-09 | End: 2025-07-10 | Stop reason: HOSPADM

## 2025-07-09 RX ORDER — POLYETHYLENE GLYCOL 3350 17 G/17G
17 POWDER, FOR SOLUTION ORAL DAILY PRN
Status: DISCONTINUED | OUTPATIENT
Start: 2025-07-09 | End: 2025-07-10 | Stop reason: HOSPADM

## 2025-07-09 RX ADMIN — SENNOSIDES AND DOCUSATE SODIUM 2 TABLET: 50; 8.6 TABLET ORAL at 21:44

## 2025-07-09 RX ADMIN — B-COMPLEX W/ C & FOLIC ACID TAB 1 TABLET: TAB at 11:53

## 2025-07-09 RX ADMIN — AMLODIPINE BESYLATE 5 MG: 5 TABLET ORAL at 10:59

## 2025-07-09 RX ADMIN — HEPARIN SODIUM 5000 UNITS: 5000 INJECTION INTRAVENOUS; SUBCUTANEOUS at 05:09

## 2025-07-09 RX ADMIN — HEPARIN SODIUM 5000 UNITS: 5000 INJECTION INTRAVENOUS; SUBCUTANEOUS at 15:00

## 2025-07-09 RX ADMIN — HEPARIN SODIUM 5000 UNITS: 5000 INJECTION INTRAVENOUS; SUBCUTANEOUS at 21:44

## 2025-07-09 RX ADMIN — GABAPENTIN 100 MG: 100 CAPSULE ORAL at 18:19

## 2025-07-09 RX ADMIN — SODIUM CHLORIDE, SODIUM GLUCONATE, SODIUM ACETATE, POTASSIUM CHLORIDE, MAGNESIUM CHLORIDE, SODIUM PHOSPHATE, DIBASIC, AND POTASSIUM PHOSPHATE 100 ML/HR: .53; .5; .37; .037; .03; .012; .00082 INJECTION, SOLUTION INTRAVENOUS at 04:57

## 2025-07-09 RX ADMIN — ASPIRIN 81 MG: 81 TABLET, CHEWABLE ORAL at 11:53

## 2025-07-09 RX ADMIN — PANTOPRAZOLE SODIUM 40 MG: 40 TABLET, DELAYED RELEASE ORAL at 05:09

## 2025-07-09 RX ADMIN — GABAPENTIN 100 MG: 100 CAPSULE ORAL at 21:44

## 2025-07-09 RX ADMIN — CHLORHEXIDINE GLUCONATE 15 ML: 1.2 SOLUTION ORAL at 09:52

## 2025-07-09 RX ADMIN — GABAPENTIN 100 MG: 100 CAPSULE ORAL at 11:53

## 2025-07-09 NOTE — OCCUPATIONAL THERAPY NOTE
Occupational Therapy Evaluation     Patient Name: Lindsay Crouch  Today's Date: 7/9/2025  Problem List  Principal Problem:    Bradycardic cardiac arrest (HCC)  Active Problems:    History of cardiac arrest    Hypertension    Controlled type 2 diabetes mellitus, without long-term current use of insulin (HCC)    DEMETRIUS (acute kidney injury) (HCC)    Arthritis    Acute respiratory failure with hypercapnia (HCC)    Hyperkalemia    Nonrheumatic aortic valve stenosis    Past Medical History  Past Medical History[1]  Past Surgical History  Past Surgical History[2]        07/09/25 0944   OT Last Visit   OT Visit Date 07/09/25   Note Type   Note type Evaluation   Additional Comments Pt seen as a co-eval with PT due to the patient's co-morbidities, clinically unstable presentation, and present impairments which are a regression from the patient's baseline.   Pain Assessment   Pain Assessment Tool 0-10   Pain Score No Pain   Restrictions/Precautions   Weight Bearing Precautions Per Order No   Other Precautions Chair Alarm;Bed Alarm;O2;Fall Risk  (4L/min, does not wear at baseline)   Home Living   Type of Home House   Home Layout Two level;Bed/bath upstairs;Stairs to enter with rails  (5 PARUL w/ HR)   Bathroom Shower/Tub Tub/shower unit   Bathroom Toilet Standard   Bathroom Equipment Shower chair   Bathroom Accessibility Accessible   Home Equipment Walker;Cane  (Pt reports SPC used occasionally at baseline - mostly for longer distances)   Prior Function   Level of [place] Independent with ADLs;Independent with functional mobility;Independent with IADLS   Lives With Daughter   Receives Help From Family   IADLs Independent with driving;Independent with meal prep;Independent with medication management   Falls in the last 6 months 0  (pt denies)   Vocational Retired   Lifestyle   Autonomy Pt resides in two level house w/ daughter; I with ADLs, mobility and IADLs at baseline; +    Reciprocal Relationships supportive  "family   Service to Others retired   Intrinsic Gratification watching food network   Subjective   Subjective \"I'm ready to go home\"   ADL   Where Assessed Chair   Eating Assistance 6  Modified independent   Grooming Assistance 5  Supervision/Setup   Grooming Deficit Wash/dry hands  (S for stance while washing hands)   UB Bathing Assistance 5  Supervision/Setup   LB Bathing Assistance 4  Minimal Assistance   UB Dressing Assistance 5  Supervision/Setup   LB Dressing Assistance 4  Minimal Assistance   Toileting Assistance  5  Supervision/Setup   Toileting Deficit Clothing management down;Clothing management up;Perineal hygiene  (perineal and perianal hygiene while seated)   Bed Mobility   Additional Comments DNT; pt seated in recliner upon arrival and conclusion of session   Transfers   Sit to Stand 5  Supervision   Additional items Assist x 1;Increased time required;Armrests;Verbal cues   Stand to Sit 5  Supervision   Additional items Assist x 1;Armrests;Increased time required;Verbal cues   Toilet transfer 5  Supervision   Additional items Assist x 1;Increased time required;Verbal cues   Functional Mobility   Functional Mobility 5  Supervision   Additional Comments Pt completed short distance ADL mobility at S level w/ RW. No significant LOB observed, SpO2 on 4L/min - 87% with increase to >90% with further mobility   Additional items Rolling walker   Balance   Static Sitting Good   Dynamic Sitting Fair +   Static Standing Fair   Dynamic Standing Fair -   Ambulatory Fair -   Activity Tolerance   Activity Tolerance Patient limited by fatigue   Medical Staff Made Aware Yes, CM made aware of d/c recs   Nurse Made Aware Yes, nursing staff patel aware of session outcomes   RUE Assessment   RUE Assessment WFL   RUE Strength   RUE Overall Strength   (4-/5)   LUE Assessment   LUE Assessment WFL   LUE Strength   LUE Overall Strength   (4-/5)   Hand Function   Gross Motor Coordination Functional   Fine Motor Coordination " Functional   Vision-Basic Assessment   Current Vision Wears glasses all the time   Psychosocial   Psychosocial (WDL) WDL   Cognition   Overall Cognitive Status WFL   Arousal/Participation Alert;Cooperative;Responsive   Attention Within functional limits   Orientation Level Oriented X4   Memory Within functional limits   Following Commands Follows one step commands with increased time or repetition   Comments Pt agreeable to OT evaluation, pleasant   Assessment   Limitation Decreased ADL status;Decreased UE strength;Decreased Safe judgement during ADL;Decreased endurance;Decreased self-care trans;Decreased high-level ADLs   Prognosis Good   Assessment Pt is a 79 y.o. female seen for OT evaluation s/p admit to [facility] on 7[facility]7/2025 w/ Bradycardic cardiac arrest (HCC).  Comorbidities affecting pt's functional performance at time of assessment include: hx of cardiac arrest, HTN, type 2 diabetes, DEMETRIUS, arthritis, hyperkalemia, PAC. Personal factors affecting pt at time of IE include:steps to enter environment, difficulty performing ADLS, difficulty performing IADLS , decreased initiation and engagement , and health management . OT order placed to assess Pt's ADLs, cognitive status, and performance during functional tasks in order to maximize safety and independence while making most appropriate d/c recommendations. Prior to admission, pt was I with ADLs, mobility and IADLs. Upon evaluation: the following deficits impact occupational performance: weakness, decreased strength, decreased balance, decreased tolerance, decreased safety awareness, and increased pain. Pt's clinical presentation is currently stable  given new onset deficits that effect Pt's occupational performance and ability to safely return to [facility] including decrease activity tolerance, decrease standing balance, decrease sitting balance, decrease performance during ADL tasks, decrease generalized strength, decrease activity engagement, decrease performance  during functional transfers, and high fall risk combined with medical complications of hypotension, poor blood pressure control, bradycardia, abnormal CBC, new onset O2 use, decreased skin integrity, and need for input for mobility technique/safety.  Pt to benefit from continued skilled OT tx while in the hospital to address deficits as defined above and maximize level of functional independence w ADL's and functional mobility. Occupational Performance areas to address include: grooming, bathing/shower, toilet hygiene, dressing, functional mobility, community mobility, and clothing management. From OT standpoint, recommendation at time of d/c would with minimal intensity OT resources.   Goals   Patient Goals to go home   Plan   Treatment Interventions ADL retraining;Functional transfer training;UE strengthening/ROM;Endurance training;Patient/family training;Compensatory technique education;Energy conservation;Activityengagement   Goal Expiration Date 07/19/25   OT Treatment Day 0   OT Frequency 2-3x/wk   Discharge Recommendation   Rehab Resource Intensity Level, OT III (Minimum Resource Intensity)   Additional Comments  The patient's raw score on the AM-PAC Daily Activity inpatient short form is 18, standardized score is 38.66, less than 39.4. Patients at this level are likely to benefit from discharge with minimal intensity OT resources. Please refer to the recommendation of the Occupational Therapist for safe discharge planning.   AM-PAC Daily Activity Inpatient   Lower Body Dressing 2   Bathing 2   Toileting 3   Upper Body Dressing 3   Grooming 4   Eating 4   Daily Activity Raw Score 18   Daily Activity Standardized Score (Calc for Raw Score >=11) 38.66   AM-PAC Applied Cognition Inpatient   Following a Speech/Presentation 3   Understanding Ordinary Conversation 4   Taking Medications 4   Remembering Where Things Are Placed or Put Away 3   Remembering List of 4-5 Errands 3   Taking Care of Complicated Tasks 3    Applied Cognition Raw Score 20   Applied Cognition Standardized Score 41.76     GOALS    Pt will achieve the following within specified time frame: LTG  Pt will achieve the following goals within 10 days    *ADL transfers with (I) for inc'd independence with ADLs/purposeful tasks    *UB ADL with (I) for inc'd independence with self cares    *LB ADL with (I) using AE prn for inc'd independence with self cares    *Toileting with (I) for clothing management and hygiene for return to PLOF with personal care    *Increase static stand balance to G- and dyn stand balance to F+ for inc'd safety with standing purposeful tasks    *Increase stand tolerance x7 m for inc'd tolerance with standing purposeful tasks    *Bed mobility- (I) for inc'd independence to manage own comfort and initiate EOB & OOB purposeful tasks    *Participate in 10-15m UE therex to increase overall stamina/activity tolerance for purposeful tasks      Melly Calzada, MS, OTR/L            [1]   Past Medical History:  Diagnosis Date    Arthritis     Diabetes mellitus (HCC)     Hypertension    [2]   Past Surgical History:  Procedure Laterality Date    HYSTERECTOMY

## 2025-07-09 NOTE — PLAN OF CARE
Problem: OCCUPATIONAL THERAPY ADULT  Goal: Performs self-care activities at highest level of function for planned discharge setting.  See evaluation for individualized goals.  Description: Treatment Interventions: ADL retraining, Functional transfer training, UE strengthening/ROM, Endurance training, Patient/family training, Compensatory technique education, Energy conservation, Activityengagement     See flowsheet documentation for full assessment, interventions and recommendations.   Note: Limitation: Decreased ADL status, Decreased UE strength, Decreased Safe judgement during ADL, Decreased endurance, Decreased self-care trans, Decreased high-level ADLs  Prognosis: Good  Assessment: Pt is a 79 y.o. female seen for OT evaluation s/p admit to [facility] on 7/7/2025 w/ Bradycardic cardiac arrest (HCC).  Comorbidities affecting pt's functional performance at time of assessment include: hx of cardiac arrest, HTN, type 2 diabetes, DEMETRIUS, arthritis, hyperkalemia, PAC. Personal factors affecting pt at time of IE include:steps to enter environment, difficulty performing ADLS, difficulty performing IADLS , decreased initiation and engagement , and health management . OT order placed to assess Pt's ADLs, cognitive status, and performance during functional tasks in order to maximize safety and independence while making most appropriate d/c recommendations. Prior to admission, pt was I with ADLs, mobility and IADLs. Upon evaluation: the following deficits impact occupational performance: weakness, decreased strength, decreased balance, decreased tolerance, decreased safety awareness, and increased pain. Pt's clinical presentation is currently stable  given new onset deficits that effect Pt's occupational performance and ability to safely return to [facility] including decrease activity tolerance, decrease standing balance, decrease sitting balance, decrease performance during ADL tasks, decrease generalized strength, decrease  activity engagement, decrease performance during functional transfers, and high fall risk combined with medical complications of hypotension, poor blood pressure control, bradycardia, abnormal CBC, new onset O2 use, decreased skin integrity, and need for input for mobility technique/safety.  Pt to benefit from continued skilled OT tx while in the hospital to address deficits as defined above and maximize level of functional independence w ADL's and functional mobility. Occupational Performance areas to address include: grooming, bathing/shower, toilet hygiene, dressing, functional mobility, community mobility, and clothing management. From OT standpoint, recommendation at time of d/c would with minimal intensity OT resources.     Rehab Resource Intensity Level, OT: III (Minimum Resource Intensity)     Melly Calzada OTR/L

## 2025-07-09 NOTE — PROGRESS NOTES
"Progress Note - Critical Care/ICU   Name: Lindsay Crouch 79 y.o. female I MRN: [id number]  Unit/Bed#: ICU 07-01 I Date of Admission: 7/7/2025   Date of Service: 7/9/2025 I Hospital Day: 2      Assessment & Plan  Bradycardic cardiac arrest (HCC)  Pt presented to the ED with c/o generalized weakness, body aches, fatigue, and dyspnea over the last few days. Per chart, pt stated that she had run out of her meds about a week ago but got a refill for them on 7/4 and has been taking them since.   Initial vitals in the ED were HR 52 and ; pt was A&O x3. While in the ED, pt became more bradycardic and blood pressures became softer. Pt was given a dose of Atropine 0.5 mg, however the bradycardia persisted and pt was to be started on an Epi gtt. Unfortunately, prior to the Epi gtt being started, pt had a bradycardic arrest. CPR was started and ROSC was obtained shortly after; pt received 1 amp of Epi. Pt was intubated during arrest. HR and BP noted to improve; pt was started on Epi gtt.   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 Pt with new DEMETRIUS, on multiple cardiac medications, shock state due to initial hypotension, and an elevated K level of 5.3  Patient was continued on Epi gtt overnight and received fluid resuscitation  Patient awake and following commands shortly after arrest so TTM was not initiated    Plan:  Continue to hold antihypertensives  Now off epi drip  Continue IVF  ECHO: EF 60%, mild AS  Troponins negative  Continuous cardiac monitoring  Cardiology consulted  Optimize electrolytes  History of cardiac arrest  Per pt's chart, pt stated that she previously had a \"heart arrest\" > 40 years ago following surgery for a hysterectomy. There is no noted documentation of the arrest noted.   Denies any stents or heart sx  Stress test completed in 2018 was negative for ischemia. Repeat stress test completed in 2021 was also negative.  Hypertension  Plan:  Hold home regimen of Nebivolol 5 mg daily, Lisinopril 20 mg " BID, Verapamil 120 mg TID, and HCTZ 25 mg every other day  Consult Cardiology for medication adjustments  Controlled type 2 diabetes mellitus, without long-term current use of insulin (HCC)  Lab Results   Component Value Date    HGBA1C 6.2 (H) 2025       Recent Labs     25  1201 25  1353 25  1606 258   POCGLU 107 90 97 95       Blood Sugar Average: Last 72 hrs:  (P) 211  Pt does not appear to be on any type of anti-hyperglycemic agents as an outpatient    Plan:  Continue accuchecks with SSI while inpatient  Hypoglycemia protocol  DEMETRIUS (acute kidney injury) ([facility])  Baseline Creatinine appears to be 1.0-1.1; noted to be 2.19 on admission  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  Creatinine has been gradually improving    Plan:  Gentle IVF   Trend renal indicies  Strict I/O  Avoid nephrotoxins and hypotension  Arthritis  Plan:  Celebrex on hold due to DEMETRIUS  Acute respiratory failure with hypercapnia ([facility])  Pt intubated during cardiac arrest  Post VB.008/68.8/87.4/16.9  Now extubated and tolerating NC oxygen    Plan:  Pulmonary toileting  Maintain SPO2 >90%  Hyperkalemia  Likely in setting of DEMETRIUS    Plan:  Now resolved  Nonrheumatic aortic valve stenosis  ECHO showing mild AS  Disposition: Critical care    ICU Core Measures     A: Assess, Prevent, and Manage Pain Has pain been assessed? Yes  Need for changes to pain regimen? No   B: Both SAT/SAT  N/A   C: Choice of Sedation RASS Goal: N/A patient not on sedation  Need for changes to sedation or analgesia regimen? N/A   D: Delirium CAM-ICU: Negative   E: Early Mobility  Plan for early mobility? Yes   F: Family Engagement Plan for family engagement today? Yes       Review of Invasive Devices:            Prophylaxis:  VTE VTE covered by:  heparin (porcine), Subcutaneous, 5,000 Units at 25 0509       Stress Ulcer  covered bypantoprazole (PROTONIX) injection 40 mg [[id number]]         24 Hour Events : Patient now requiring 4 L NC. Suspect  this may be secondary to atelectasis. Encourage pulmonary toileting including IS.   Subjective   Review of Systems: Review of Systems   Constitutional:  Positive for fatigue.   Respiratory:  Negative for shortness of breath.    Cardiovascular:  Negative for chest pain.   All other systems reviewed and are negative.      Objective :                   Vitals I/O      Most Recent Min/Max in 24hrs   Temp 97.8 °F (36.6 °C) Temp  Min: 97.3 °F (36.3 °C)  Max: 99.3 °F (37.4 °C)   Pulse 61 Pulse  Min: 60  Max: 92   Resp 22 Resp  Min: 12  Max: 22   /68 BP  Min: 96/39  Max: 161/68   O2 Sat 93 % SpO2  Min: 92 %  Max: 100 %      Intake/Output Summary (Last 24 hours) at 7/9/2025 0586  Last data filed at 7/9/2025 0347  Gross per 24 hour   Intake 2778.01 ml   Output 1710 ml   Net 1068.01 ml       Diet Abdulaziz/CHO Controlled; Consistent Carbohydrate Diet Level 1 (4 carb servings/60 grams CHO/meal)    Invasive Monitoring           Physical Exam   Physical Exam  Vitals reviewed.   Eyes:      Conjunctiva/sclera: Conjunctivae normal.   Skin:     General: Skin is warm and dry.   HENT:      Head: Normocephalic and atraumatic.      Mouth/Throat:      Mouth: Mucous membranes are moist.   Cardiovascular:      Rate and Rhythm: Normal rate and regular rhythm.      Pulses: Normal pulses.      Heart sounds: Murmur heard.   Musculoskeletal:      Right lower leg: No edema.      Left lower leg: No edema.   Abdominal:      Palpations: Abdomen is soft.   Constitutional:       General: She is not in acute distress.     Interventions: Nasal cannula in place.   Pulmonary:      Effort: Pulmonary effort is normal.      Breath sounds: Decreased breath sounds present.   Psychiatric:         Behavior: Behavior is cooperative.   Neurological:      General: No focal deficit present.      Mental Status: She is alert and oriented to person, place and time. Mental status is at baseline.      Motor: gross motor function is at baseline for patient.           Diagnostic Studies        Lab Results: I have reviewed the following results:     Medications:  Scheduled PRN   chlorhexidine, 15 mL, Q12H SOUTH  heparin (porcine), 5,000 Units, Q8H SOUTH  insulin lispro, 1-5 Units, TID AC  insulin lispro, 1-5 Units, HS  pantoprazole, 40 mg, Early Morning          Continuous    multi-electrolyte, 100 mL/hr, Last Rate: 100 mL/hr (07/09/25 0457)         Labs:   CBC    Recent Labs     07/08/25 0426 07/09/25 0333   WBC 12.25* 5.82   HGB 11.2* 10.1*   HCT 33.8* 31.6*    162   BANDSPCT 5  --      BMP    Recent Labs     07/08/25 1954 07/09/25 0333   SODIUM 135 136   K 5.0 5.1    105   CO2 25 25   AGAP 7 6   BUN 53* 46*   CREATININE 1.92* 1.55*   CALCIUM 8.1* 8.5       Coags    Recent Labs     07/08/25 0023   INR 1.27*   PTT 26        Additional Electrolytes  Recent Labs     07/08/25 0426 07/08/25 1003 07/09/25 0333   MG 2.3 2.4 2.2   PHOS 4.1  --  4.2*   CAIONIZED  --  0.98* 1.13          Blood Gas    Recent Labs     07/08/25 0454   PHART 7.338*   RCT2PXF 32.8*   PO2ART 350.5*   WDG8NYB 17.2*   BEART -7.6   SOURCE Brachial, Right     Recent Labs     07/08/25 0023 07/08/25 0454   PHVEN 7.008*  --    NHV4DOJ 68.8*  --    PO2VEN 84.7*  --    NOB2JRK 16.9*  --    BEVEN -14.8  --    V0VALLK 89.7*  --    SOURCE  --  Brachial, Right    LFTs  Recent Labs     07/08/25 0426 07/09/25 0333   ALT 61* 44   AST 80* 38   ALKPHOS 83 67   ALB 3.6 3.1*   TBILI 0.80 0.82       Infectious  Recent Labs     07/08/25 0418   PROCALCITONI 0.29*     Glucose  Recent Labs     07/08/25 0426 07/08/25 1003 07/08/25 1954 07/09/25 0333   GLUC 337* 183* 104 89

## 2025-07-09 NOTE — PROGRESS NOTES
"Progress Note - Cardiology   Name: Lindsay Crouch 79 y.o. female I MRN: [id number]  Unit/Bed#: ICU 07-01 I Date of Admission: 7/7/2025   Date of Service: 7/9/2025 I Hospital Day: 2    Assessment & Plan  Bradycardic cardiac arrest (HCC)  Was on metoprolol and verapamil.  These will be held.  Unclear whether meds were part of precipitating the events.  Would continue to hold but in the future I would not be against resuming metoprolol.  Hypertension  OK to be permissive for now and readdress at PCP at outpatient.  Would avoid ACE-I and verapamil going forward.  Amlodipine would be my first choice to initiate if needed.  DEMETRIUS (acute kidney injury) (HCC)  Resolving with volume.  Nonrheumatic aortic valve stenosis  Very mild.      Outpatient Cardiologist: None needed      Subjective:   Patient seen and examined.    Feeling well.  No dyspnea     Summary comments:  Extubated.  Labs improving.  No further cardiac workup is needed over the near term.  See comments above regarding hypertension.    Telemetry/ECG/Cardiac testing:    TTE 7/8/2025: Normal LV systolic function.  Mild LVH.  Very mild aortic stenosis.    Vitals: Blood pressure 151/67, pulse 62, temperature 97.8 °F (36.6 °C), resp. rate 21, height 5' 4\" (1.626 m), weight 84 kg (185 lb 3 oz), SpO2 93%.,   Orthostatic Blood Pressures      Flowsheet Row Most Recent Value   Blood Pressure 151/67 filed at 07/09/2025 0600   Patient Position - Orthostatic VS Lying filed at 07/08/2025 0930        ,   Weight (last 2 days)       Date/Time Weight    07/09/25 0505 84 (185.19)    07/08/25 0730 84.4 (186)    07/08/25 0600 84.4 (186.07)    07/08/25 0145 82.3 (181.44)    07/08/25 0122 82.3 (181.44)            Physical Exam:    General:  Normal appearance in no distress.  Eyes:  Anicteric.  Oral mucosa:  Moist.  Neck:  No JV D. Carotid upstrokes are brisk without bruits.  No masses.  Chest:  Clear to auscultation and percussion.  Cardiac:  No palpable PMI.  Normal S1 and S2.  No " murmur gallop or rub.  Abdomen:  Soft and nontender. No palpable organomegaly or aortic enlargement.  Extremities:  No peripheral edema.  Neuro:  Grossly symmetric.  Psych:  Alert and oriented x3.      Medications:    Current Medications[1]     Labs & Results:  Labs reviewed and prominent abnormalities reviewed above and/or below.  Troponins:    Results from last 7 days   Lab Units 07/08/25  0543 07/08/25  0418 07/08/25  0208   HS TNI 0HR ng/L  --   --  11   HS TNI 2HR ng/L  --  16  --    HSTNI D2 ng/L  --  5  --    HS TNI 4HR ng/L 17  --   --    HSTNI D4 ng/L 6  --   --         BNP:   Results from last 6 Months   Lab Units 07/08/25  0426 07/07/25  2214   BNP pg/mL 450* 551*                    [1]   Current Facility-Administered Medications:     acetaminophen (TYLENOL) tablet 650 mg, 650 mg, Oral, Q6H PRN, ODIN Ghosh    chlorhexidine (PERIDEX) 0.12 % oral rinse 15 mL, 15 mL, Mouth/Throat, Q12H [facility], ODIN [place], 15 mL at 07/08/25 2125    heparin (porcine) subcutaneous injection 5,000 Units, 5,000 Units, Subcutaneous, Q8H [facility], ODIN [place], 5,000 Units at 07/09/25 0509    insulin lispro (HumALOG/ADMELOG) 100 units/mL subcutaneous injection 1-5 Units, 1-5 Units, Subcutaneous, 4x Daily (AC & HS) **AND** Fingerstick Glucose (POCT), , , 4x Daily AC and at bedtime, ODIN Ghosh    multi-electrolyte (Plasmalyte-A/Isolyte-S PH 7.4/Normosol-R) IV solution, 50 mL/hr, Intravenous, Continuous, ODIN Ghosh, Last Rate: 100 mL/hr at 07/09/25 0457, 100 mL/hr at 07/09/25 0457    pantoprazole (PROTONIX) EC tablet 40 mg, 40 mg, Oral, Early Morning, ODIN Brito, 40 mg at 07/09/25 0509    polyethylene glycol (MIRALAX) packet 17 g, 17 g, Oral, Daily PRN, ODIN Ghosh    senna-docusate sodium (SENOKOT S) 8.6-50 mg per tablet 2 tablet, 2 tablet, Oral, HS, ODIN Ghosh

## 2025-07-09 NOTE — ASSESSMENT & PLAN NOTE
Was on metoprolol and verapamil.  These will be held.  Unclear whether meds were part of precipitating the events.  Would continue to hold but in the future I would not be against resuming metoprolol.

## 2025-07-09 NOTE — PHYSICAL THERAPY NOTE
PHYSICAL THERAPY EVALUATION  NAME:  Lindsay Crouch  DATE: 07/09/25    AGE:   79 y.o.  Mrn:   [id number]  ADMIT DX:  Weakness generalized [R53.1]  Dyspnea [R06.00]  DEMETRIUS (acute kidney injury) (HCC) [N17.9]  Generalized weakness [R53.1]  Bradycardic cardiac arrest (HCC) [R00.1, I46.2]  Problem List: Problem List[1]    Past Medical History  Past Medical History[2]    Past Surgical History  Past Surgical History[3]    Length Of Stay: 2  Performed at least 2 patient identifiers during session: Name and ID bracelet       07/09/25 1002   PT Last Visit   PT Visit Date 07/09/25   Note Type   Note type Evaluation   Pain Assessment   Pain Assessment Tool 0-10   Pain Score No Pain   Restrictions/Precautions   Weight Bearing Precautions Per Order No   Other Precautions O2;Multiple lines;Chair Alarm;Fall Risk  (4 L/min)   Home Living   Type of Home House   Home Layout Two level;Bed/bath upstairs;Stairs to enter with rails  (5 PARUL)   Bathroom Shower/Tub Tub/shower unit   Bathroom Toilet Standard   Bathroom Equipment Shower chair   Home Equipment Walker;Cane  (uses SPC occ at baseline (for longer distances))   Prior Function   Level of [place] Independent with ADLs;Independent with functional mobility;Independent with IADLS   Lives With Daughter   IADLs Independent with driving;Independent with meal prep;Independent with medication management   Falls in the last 6 months 0   Vocational Retired   General   Family/Caregiver Present Yes   Cognition   Overall Cognitive Status WFL   Arousal/Participation Alert   Orientation Level Oriented X4   Memory Within functional limits   Following Commands Follows one step commands without difficulty   RLE Assessment   RLE Assessment WFL   LLE Assessment   LLE Assessment WFL   Vision-Basic Assessment   Current Vision Wears glasses all the time   Coordination   Sensation WFL   Bed Mobility   Additional Comments pt denied dizziness with transitional movement   Transfers   Sit to Stand 5   Supervision   Additional items Assist x 1;Increased time required;Armrests;Verbal cues   Stand to Sit 5  Supervision   Additional items Assist x 1;Increased time required;Armrests;Verbal cues   Toilet transfer 5  Supervision   Additional items Increased time required;Verbal cues  (bariatric toilet; washed hands at sink with Supervision)   Additional Comments pt required cues for proper hand placement   Ambulation/Elevation   Gait pattern Improper Weight shift;Decreased foot clearance   Gait Assistance 5  Supervision   Additional items Verbal cues   Assistive Device Rolling walker   Distance 250 ft   Ambulation/Elevation Additional Comments good safety awareness noted   Balance   Static Sitting Good   Dynamic Sitting Fair +   Static Standing Fair   Dynamic Standing Fair -   Ambulatory Fair -   Endurance Deficit   Endurance Deficit Yes   Endurance Deficit Description SpO2 decreased to 86% with initial mobilty however increased above 90% upon sitting and remained above 90% with ambulation   Assessment   Prognosis Good   Assessment Pt is 79 y.o. female seen for PT evaluation s/p admit to [facility] on 7/7/2025 w/ Bradycardic cardiac arrest (HCC). PT consulted to assess pt's functional mobility and d/c needs. Order placed for PT eval and tx. Pt agreeable to PT  session upon arrival, pt found seated OOB in recliner.  PTA, pt was independent w/ all functional mobility w/ occ use of SPC for long distances, ambulates community distances and elevations, has 5 PARUL, lives w/ daughter in 2 level home, and retired.  Pt to benefit from continued PT tx to address deficits and maximize level of functional independent mobility and consistency. Upon conclusion pt  seated in recliner. Complexity: Comorbidities affecting pt's physical performance at time of assessment include: DM and SKI . Personal factors affecting pt at time of IE include: advanced age, lives in multistory home, ambulating with assistive device, and steps to  enter home. Please find objective findings from PT assessment regarding body systems outlined above with impairments and limitations including impaired balance, decreased endurance, gait deviations, and fall risk.  Pt's clinical presentation is currently evolving seen in pt's presentation of abnormal renal values, abnormal H&H, low SaO2 levels, new onset of O2 use, critical care status, and recent extubation. The patient's AM-PAC Basic Mobility Inpatient Short Form Raw Score is 19.  Based on patient presentations and impairments, pt would most appropriately benefit from Level 3 resource intensity upon discharge. A Raw score of greater than 16 suggests the patient may benefit from discharge to home. Please also refer to the recommendation of the Physical Therapist for safe discharge planning. RN verbalized pt appropriate for PT session. Pt seen as a co-eval with OT due to the patient's co-morbidities, clinically evolving presentation, and recent extubation.   Barriers to Discharge Inaccessible home environment   Goals   Patient Goals to go home today   LTG Expiration Date 07/19/25   Long Term Goal #1 Pt will: Perform bed mobility tasks to modified I to improve ease of bed mobility. Perform transfers to modified I to improve ease of transfers. Perform ambulation with MI and LRAD for 250 feet to increase Indep in home environment. Increase dynamic standing balance to F+ to decrease fall risk. Increase OOB activity tolerance to 10 minutes without s/s of exertion to decrease fall risk. Navigate up and down 5 steps with MI so patient can enter and exit home.   Plan   Treatment/Interventions Functional transfer training;Therapeutic exercise;Endurance training;Gait training;Bed mobility;Equipment eval/education   PT Frequency 3-5x/wk   Discharge Recommendation   Rehab Resource Intensity Level, PT III (Minimum Resource Intensity)   Equipment Recommended Walker   Walker Package Recommended Wheeled walker   AM-PAC Basic  Mobility Inpatient   Turning in Flat Bed Without Bedrails 4   Lying on Back to Sitting on Edge of Flat Bed Without Bedrails 3   Moving Bed to Chair 3   Standing Up From Chair Using Arms 3   Walk in Room 3   Climb 3-5 Stairs With Railing 3   Basic Mobility Inpatient Raw Score 19   Basic Mobility Standardized Score 42.48   [facility] Highest Level Of Mobility   -HLM Goal 6: Walk 10 steps or more   JH-HLM Achieved 8: Walk 250 feet ot more       Time In: 0944  Time Out: 1002  Total Evaluation Minutes: 18    Caryl Miles, PT         [1]   Patient Active Problem List  Diagnosis    Chest pain    History of cardiac arrest    Hypertension    Controlled type 2 diabetes mellitus, without long-term current use of insulin (HCC)    PAC (premature atrial contraction)    DEMETRIUS (acute kidney injury) (HCC)    Paresthesia of skin    Radiculopathy, lumbar region    Arthritis    Bradycardic cardiac arrest (HCC)    Acute respiratory failure with hypercapnia (HCC)    Hyperkalemia    Nonrheumatic aortic valve stenosis   [2]   Past Medical History:  Diagnosis Date    Arthritis     Diabetes mellitus (HCC)     Hypertension    [3]   Past Surgical History:  Procedure Laterality Date    HYSTERECTOMY

## 2025-07-09 NOTE — PLAN OF CARE
Problem: SAFETY,RESTRAINT: NV/NON-SELF DESTRUCTIVE BEHAVIOR  Goal: Remains free of harm/injury (restraint for non violent/non self-detsructive behavior)  Description: INTERVENTIONS:  - Instruct patient/family regarding restraint use   - Assess and monitor physiologic and psychological status   - Provide interventions and comfort measures to meet assessed patient needs   - Identify and implement measures to help patient regain control  - Assess readiness for release of restraint   Outcome: Progressing  Goal: Returns to optimal restraint-free functioning  Description: INTERVENTIONS:  - Assess the patient's behavior and symptoms that indicate continued need for restraint  - Identify and implement measures to help patient regain control  - Assess readiness for release of restraint   Outcome: Progressing     Problem: Prexisting or High Potential for Compromised Skin Integrity  Goal: Skin integrity is maintained or improved  Description: INTERVENTIONS:  - Identify patients at risk for skin breakdown  - Assess and monitor skin integrity including under and around medical devices   - Assess and monitor nutrition and hydration status  - Monitor labs  - Assess for incontinence   - Turn and reposition patient  - Assist with mobility/ambulation  - Relieve pressure over saadia prominences   - Avoid friction and shearing  - Provide appropriate hygiene as needed including keeping skin clean and dry  - Evaluate need for skin moisturizer/barrier cream  - Collaborate with interdisciplinary team  - Patient/family teaching  - Consider wound care consult    Assess:  - Review Cliff scale daily  - Clean and moisturize skin every shift  - Inspect skin when repositioning, toileting, and assisting with ADLS  - Assess under medical devices  - Assess extremities for adequate circulation and sensation     Bed Management:  - Have minimal linens on bed & keep smooth, unwrinkled  - Change linens as needed when moist or perspiring  - Avoid  sitting or lying in one position for more than 2 hours while in bed?Keep HOB at 30 degrees   - Toileting:  - Offer bedside commode  - Assess for incontinence every hour  - Use incontinent care products after each incontinent episode    Activity:  - Encourage or provide ROM exercises   - Turn and reposition patient every 2 Hours  - Use appropriate equipment to lift or move patient in bed    Skin Care:  - Avoid use of baby powder, tape, friction and shearing, hot water or constrictive clothing  - Relieve pressure over bony prominences  - Do not massage red bony areas    Next Steps:  - Teach patient strategies to minimize risks  - Consider consults to  interdisciplinary teams  Outcome: Progressing     Problem: PAIN - ADULT  Goal: Verbalizes/displays adequate comfort level or baseline comfort level  Description: Interventions:  - Encourage patient to monitor pain and request assistance  - Assess pain using appropriate pain scale  - Administer analgesics as ordered based on type and severity of pain and evaluate response  - Implement non-pharmacological measures as appropriate and evaluate response  - Consider cultural and social influences on pain and pain management  - Notify physician/advanced practitioner if interventions unsuccessful or patient reports new pain  - Educate patient/family on pain management process including their role and importance of  reporting pain   - Provide non-pharmacologic/complimentary pain relief interventions  Outcome: Progressing     Problem: INFECTION - ADULT  Goal: Absence or prevention of progression during hospitalization  Description: INTERVENTIONS:  - Assess and monitor for signs and symptoms of infection  - Monitor lab/diagnostic results  - Monitor all insertion sites, i.e. indwelling lines, tubes, and drains  - Monitor endotracheal if appropriate and nasal secretions for changes in amount and color  - Jelm appropriate cooling/warming therapies per order  - Administer  medications as ordered  - Instruct and encourage patient and family to use good hand hygiene technique  - Identify and instruct in appropriate isolation precautions for identified infection/condition  Outcome: Progressing  Goal: Absence of fever/infection during neutropenic period  Description: INTERVENTIONS:  - Monitor WBC  - Perform strict hand hygiene  - Limit to healthy visitors only  - No plants, dried, fresh or silk flowers with dillon in patient room  Outcome: Progressing     Problem: SAFETY ADULT  Goal: Patient will remain free of falls  Description: INTERVENTIONS:  - Educate patient/family on patient safety including physical limitations  - Instruct patient to call for assistance with activity   - Consider consulting OT/PT to assist with strengthening/mobility based on AM PAC & JH-HLM score  - Consult OT/PT to assist with strengthening/mobility   - Keep Call bell within reach  - Keep bed low and locked with side rails adjusted as appropriate  - Keep care items and personal belongings within reach  - Initiate and maintain comfort rounds  - Make Fall Risk Sign visible to staff  - Offer Toileting every 2 Hours, in advance of need  - Initiate/Maintain bed alarm  - Obtain necessary fall risk management equipment  - Apply yellow socks and bracelet for high fall risk patients  - Consider moving patient to room near nurses station  Outcome: Progressing  Goal: Maintain or return to baseline ADL function  Description: INTERVENTIONS:  -  Assess patient's ability to carry out ADLs; assess patient's baseline for ADL function and identify physical deficits which impact ability to perform ADLs (bathing, care of mouth/teeth, toileting, grooming, dressing, etc.)  - Assess/evaluate cause of self-care deficits   - Assess range of motion  - Assess patient's mobility; develop plan if impaired  - Assess patient's need for assistive devices and provide as appropriate  - Encourage maximum independence but intervene and supervise  when necessary  - Involve family in performance of ADLs  - Assess for home care needs following discharge   - Consider OT consult to assist with ADL evaluation and planning for discharge  - Provide patient education as appropriate  - Monitor functional capacity and physical performance, use of AM PAC & JH-HLM   - Monitor gait, balance and fatigue with ambulation    Outcome: Progressing  Goal: Maintains/Returns to pre admission functional level  Description: INTERVENTIONS:  - Perform AM-PAC 6 Click Basic Mobility/ Daily Activity assessment daily.  - Set and communicate daily mobility goal to care team and patient/family/caregiver.   - Collaborate with rehabilitation services on mobility goals if consulted  - Perform Range of Motion 3 times a day.  - Reposition patient every 2 hours.  - Record patient progress and toleration of activity level   Outcome: Progressing     Problem: DISCHARGE PLANNING  Goal: Discharge to home or other facility with appropriate resources  Description: INTERVENTIONS:  - Identify barriers to discharge w/patient and caregiver  - Arrange for needed discharge resources and transportation as appropriate  - Identify discharge learning needs (meds, wound care, etc.)  - Arrange for interpretive services to assist at discharge as needed  - Refer to Case Management Department for coordinating discharge planning if the patient needs post-hospital services based on physician/advanced practitioner order or complex needs related to functional status, cognitive ability, or social support system  Outcome: Progressing     Problem: Knowledge Deficit  Goal: Patient/family/caregiver demonstrates understanding of disease process, treatment plan, medications, and discharge instructions  Description: Complete learning assessment and assess knowledge base.  Interventions:  - Provide teaching at level of understanding  - Provide teaching via preferred learning methods  Outcome: Progressing     Problem:  Nutrition/Hydration-ADULT  Goal: Nutrient/Hydration intake appropriate for improving, restoring or maintaining nutritional needs  Description: Monitor and assess patient's nutrition/hydration status for malnutrition. Collaborate with interdisciplinary team and initiate plan and interventions as ordered.  Monitor patient's weight and dietary intake as ordered or per policy. Utilize nutrition screening tool and intervene as necessary. Determine patient's food preferences and provide high-protein, high-caloric foods as appropriate.     INTERVENTIONS:  - Monitor oral intake, urinary output, labs, and treatment plans  - Assess nutrition and hydration status and recommend course of action  - Evaluate amount of meals eaten  - Assist patient with eating if necessary   - Allow adequate time for meals  - Recommend/ encourage appropriate diets, oral nutritional supplements, and vitamin/mineral supplements  - Order, calculate, and assess calorie counts as needed  - Recommend, monitor, and adjust tube feedings and TPN/PPN based on assessed needs  - Assess need for intravenous fluids  - Provide specific nutrition/hydration education as appropriate  - Include patient/family/caregiver in decisions related to nutrition  Outcome: Progressing

## 2025-07-09 NOTE — PLAN OF CARE
Problem: PHYSICAL THERAPY ADULT  Goal: Performs mobility at highest level of function for planned discharge setting.  See evaluation for individualized goals.  Description: Treatment/Interventions: Functional transfer training, Therapeutic exercise, Endurance training, Gait training, Bed mobility, Equipment eval/education  Equipment Recommended: Walker       See flowsheet documentation for full assessment, interventions and recommendations.  Outcome: Progressing  Note: Prognosis: Good     Assessment: Pt is 79 y.o. female seen for PT evaluation s/p admit to [facility] on 7/7/2025 w/ Bradycardic cardiac arrest (HCC). PT consulted to assess pt's functional mobility and d/c needs. Order placed for PT eval and tx. Pt agreeable to PT  session upon arrival, pt found seated OOB in recliner.  PTA, pt was independent w/ all functional mobility w/ occ use of SPC for long distances, ambulates community distances and elevations, has 5 PARUL, lives w/ daughter in 2 level home, and retired.  Pt to benefit from continued PT tx to address deficits and maximize level of functional independent mobility and consistency. Upon conclusion pt  seated in recliner. Complexity: Comorbidities affecting pt's physical performance at time of assessment include: DM and SKI . Personal factors affecting pt at time of IE include: advanced age, lives in multistory home, ambulating with assistive device, and steps to enter home. Please find objective findings from PT assessment regarding body systems outlined above with impairments and limitations including impaired balance, decreased endurance, gait deviations, and fall risk.  Pt's clinical presentation is currently evolving seen in pt's presentation of abnormal renal values, abnormal H&H, low SaO2 levels, new onset of O2 use, critical care status, and recent extubation. The patient's AM-PAC Basic Mobility Inpatient Short Form Raw Score is 19.  Based on patient presentations and impairments, pt  would most appropriately benefit from Level 3 resource intensity upon discharge. A Raw score of greater than 16 suggests the patient may benefit from discharge to home. Please also refer to the recommendation of the Physical Therapist for safe discharge planning. RN verbalized pt appropriate for PT session. Pt seen as a co-eval with OT due to the patient's co-morbidities, clinically evolving presentation, and recent extubation.  Barriers to Discharge: Inaccessible home environment     Rehab Resource Intensity Level, PT: III (Minimum Resource Intensity)    See flowsheet documentation for full assessment.

## 2025-07-09 NOTE — PLAN OF CARE
Problem: PAIN - ADULT  Goal: Verbalizes/displays adequate comfort level or baseline comfort level  Description: Interventions:  - Encourage patient to monitor pain and request assistance  - Assess pain using appropriate pain scale  - Administer analgesics as ordered based on type and severity of pain and evaluate response  - Implement non-pharmacological measures as appropriate and evaluate response  - Consider cultural and social influences on pain and pain management  - Notify physician/advanced practitioner if interventions unsuccessful or patient reports new pain  - Educate patient/family on pain management process including their role and importance of  reporting pain   - Provide non-pharmacologic/complimentary pain relief interventions  Outcome: Progressing     Problem: INFECTION - ADULT  Goal: Absence or prevention of progression during hospitalization  Description: INTERVENTIONS:  - Assess and monitor for signs and symptoms of infection  - Monitor lab/diagnostic results  - Monitor all insertion sites, i.e. indwelling lines, tubes, and drains  - Monitor endotracheal if appropriate and nasal secretions for changes in amount and color  - Mellen appropriate cooling/warming therapies per order  - Administer medications as ordered  - Instruct and encourage patient and family to use good hand hygiene technique  - Identify and instruct in appropriate isolation precautions for identified infection/condition  Outcome: Progressing

## 2025-07-09 NOTE — PROGRESS NOTES
Progress Note - Critical Care/ICU   Name: Lindsay Crouch 79 y.o. 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 Patient was intubation at time of arrest, but fortunately was able to be extubated later in day as MS remained intact. Patient received volume resuscitation, temporizing measures for hyperkalemia, and required initiation of Epi gtt, though this was able to be weaned off quickly. Cardiology was consulted for assistance w/antihypertensive regimen and today Amlodipine 5 mg daily was started.     Barriers to discharge:   Previously on Verapamil 120 mg TID, Nebivolol 5 mg Daily, Lisinopril 20 mg BID, and HCTZ 25 mg QOD - will need to have regimen readjusted  Amlodipine 5 mg daily started today   Would continue to hold BB at this point  Cardiology following  IVF stopped as patient is now taking in adequate PO intake  Wean O2 for SpO2 > 88%  Encourage I/S  PT/OT       Consults: IP CONSULT TO CASE MANAGEMENT  IP CONSULT TO CARDIOLOGY    Recommended to review admission imaging for incidental findings and document in discharge navigator: Chart reviewed, no known incidental findings noted at this time.      Discharge Plan: Anticipate discharge in 24-48 hrs to home.       Patient seen and evaluated by Critical Care today and deemed to be appropriate for transfer to Med Surg with Telemetry. Spoke to Dr. Gifford from [facility] to accept transfer. Critical care can be contacted via SecureChat with any questions or concerns. Please use the Critical Care AP Role in Secure Chat for any provider inquires until the patient is  transferred out of the ICU or until tomorrow at 0600.

## 2025-07-09 NOTE — ASSESSMENT & PLAN NOTE
Lab Results   Component Value Date    HGBA1C 6.2 (H) 07/08/2025       Recent Labs     07/08/25  1201 07/08/25  1353 07/08/25  1606 07/08/25 2058   POCGLU 107 90 97 95       Blood Sugar Average: Last 72 hrs:  (P) 211  Pt does not appear to be on any type of anti-hyperglycemic agents as an outpatient    Plan:  Continue accuchecks with SSI while inpatient  Hypoglycemia protocol

## 2025-07-09 NOTE — ASSESSMENT & PLAN NOTE
Pt intubated during cardiac arrest  Post VB.008/68.8/87.4/16.9  Now extubated and tolerating NC oxygen    Plan:  Pulmonary toileting  Maintain SPO2 >90%

## 2025-07-09 NOTE — ASSESSMENT & PLAN NOTE
OK to be permissive for now and readdress at PCP at outpatient.  Would avoid ACE-I and verapamil going forward.  Amlodipine would be my first choice to initiate if needed.

## 2025-07-09 NOTE — ASSESSMENT & PLAN NOTE
Pt presented to the ED with c/o generalized weakness, body aches, fatigue, and dyspnea over the last few days. Per chart, pt stated that she had run out of her meds about a week ago but got a refill for them on 7/4 and has been taking them since.   Initial vitals in the ED were HR 52 and ; pt was A&O x3. While in the ED, pt became more bradycardic and blood pressures became softer. Pt was given a dose of Atropine 0.5 mg, however the bradycardia persisted and pt was to be started on an Epi gtt. Unfortunately, prior to the Epi gtt being started, pt had a bradycardic arrest. CPR was started and ROSC was obtained shortly after; pt received 1 amp of Epi. Pt was intubated during arrest. HR and BP noted to improve; pt was started on Epi gtt.   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 Pt with new DEMETRIUS, on multiple cardiac medications, shock state due to initial hypotension, and an elevated K level of 5.3  Patient was continued on Epi gtt overnight and received fluid resuscitation  Patient awake and following commands shortly after arrest so TTM was not initiated    Plan:  Continue to hold antihypertensives  Now off epi drip  Continue IVF  ECHO: EF 60%, mild AS  Troponins negative  Continuous cardiac monitoring  Cardiology consulted  Optimize electrolytes

## 2025-07-09 NOTE — ASSESSMENT & PLAN NOTE
Baseline Creatinine appears to be 1.0-1.1; noted to be 2.19 on admission  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  Creatinine has been gradually improving    Plan:  Gentle IVF   Trend renal indicies  Strict I/O  Avoid nephrotoxins and hypotension

## 2025-07-10 VITALS
TEMPERATURE: 97.8 F | OXYGEN SATURATION: 93 % | DIASTOLIC BLOOD PRESSURE: 78 MMHG | WEIGHT: 176.81 LBS | HEIGHT: 64 IN | RESPIRATION RATE: 18 BRPM | BODY MASS INDEX: 30.19 KG/M2 | HEART RATE: 70 BPM | SYSTOLIC BLOOD PRESSURE: 170 MMHG

## 2025-07-10 PROBLEM — E87.5 HYPERKALEMIA: Status: RESOLVED | Noted: 2025-07-08 | Resolved: 2025-07-10

## 2025-07-10 LAB
ANION GAP SERPL CALCULATED.3IONS-SCNC: 6 MMOL/L (ref 4–13)
BUN SERPL-MCNC: 30 MG/DL (ref 5–25)
CA-I BLD-SCNC: 1.12 MMOL/L (ref 1.12–1.32)
CALCIUM SERPL-MCNC: 8.4 MG/DL (ref 8.4–10.2)
CHLORIDE SERPL-SCNC: 106 MMOL/L (ref 96–108)
CO2 SERPL-SCNC: 28 MMOL/L (ref 21–32)
CREAT SERPL-MCNC: 1.05 MG/DL (ref 0.6–1.3)
ERYTHROCYTE [DISTWIDTH] IN BLOOD BY AUTOMATED COUNT: 12.6 % (ref 11.6–15.1)
GFR SERPL CREATININE-BSD FRML MDRD: 50 ML/MIN/1.73SQ M
GLUCOSE SERPL-MCNC: 103 MG/DL (ref 65–140)
GLUCOSE SERPL-MCNC: 103 MG/DL (ref 65–140)
GLUCOSE SERPL-MCNC: 115 MG/DL (ref 65–140)
GLUCOSE SERPL-MCNC: 85 MG/DL (ref 65–140)
GLUCOSE SERPL-MCNC: 87 MG/DL (ref 65–140)
HCT VFR BLD AUTO: 33.3 % (ref 34.8–46.1)
HGB BLD-MCNC: 11.1 G/DL (ref 11.5–15.4)
MAGNESIUM SERPL-MCNC: 1.9 MG/DL (ref 1.9–2.7)
MCH RBC QN AUTO: 30.2 PG (ref 26.8–34.3)
MCHC RBC AUTO-ENTMCNC: 33.3 G/DL (ref 31.4–37.4)
MCV RBC AUTO: 91 FL (ref 82–98)
PHOSPHATE SERPL-MCNC: 3.4 MG/DL (ref 2.3–4.1)
PLATELET # BLD AUTO: 178 THOUSANDS/UL (ref 149–390)
PMV BLD AUTO: 11.2 FL (ref 8.9–12.7)
POTASSIUM SERPL-SCNC: 4.3 MMOL/L (ref 3.5–5.3)
RBC # BLD AUTO: 3.67 MILLION/UL (ref 3.81–5.12)
SODIUM SERPL-SCNC: 140 MMOL/L (ref 135–147)
WBC # BLD AUTO: 5.04 THOUSAND/UL (ref 4.31–10.16)

## 2025-07-10 PROCEDURE — 99239 HOSP IP/OBS DSCHRG MGMT >30: CPT | Performed by: NURSE PRACTITIONER

## 2025-07-10 PROCEDURE — 82330 ASSAY OF CALCIUM: CPT | Performed by: NURSE PRACTITIONER

## 2025-07-10 PROCEDURE — 80048 BASIC METABOLIC PNL TOTAL CA: CPT | Performed by: NURSE PRACTITIONER

## 2025-07-10 PROCEDURE — 85027 COMPLETE CBC AUTOMATED: CPT | Performed by: NURSE PRACTITIONER

## 2025-07-10 PROCEDURE — 83735 ASSAY OF MAGNESIUM: CPT | Performed by: NURSE PRACTITIONER

## 2025-07-10 PROCEDURE — 82948 REAGENT STRIP/BLOOD GLUCOSE: CPT

## 2025-07-10 PROCEDURE — 84100 ASSAY OF PHOSPHORUS: CPT | Performed by: NURSE PRACTITIONER

## 2025-07-10 RX ORDER — GABAPENTIN 300 MG/1
300 CAPSULE ORAL DAILY
Start: 2025-07-10

## 2025-07-10 RX ORDER — AMLODIPINE BESYLATE 5 MG/1
5 TABLET ORAL DAILY
Qty: 30 TABLET | Refills: 0 | Status: SHIPPED | OUTPATIENT
Start: 2025-07-11 | End: 2025-07-10

## 2025-07-10 RX ORDER — AMLODIPINE BESYLATE 10 MG/1
10 TABLET ORAL DAILY
Qty: 30 TABLET | Refills: 0 | Status: SHIPPED | OUTPATIENT
Start: 2025-07-10

## 2025-07-10 RX ORDER — LISINOPRIL 20 MG/1
20 TABLET ORAL DAILY
Start: 2025-07-10 | End: 2025-07-10

## 2025-07-10 RX ORDER — GABAPENTIN 300 MG/1
300 CAPSULE ORAL DAILY
Start: 2025-07-10 | End: 2025-07-10

## 2025-07-10 RX ADMIN — HEPARIN SODIUM 5000 UNITS: 5000 INJECTION INTRAVENOUS; SUBCUTANEOUS at 05:14

## 2025-07-10 RX ADMIN — ASPIRIN 81 MG: 81 TABLET, CHEWABLE ORAL at 08:58

## 2025-07-10 RX ADMIN — PANTOPRAZOLE SODIUM 40 MG: 40 TABLET, DELAYED RELEASE ORAL at 05:14

## 2025-07-10 RX ADMIN — AMLODIPINE BESYLATE 5 MG: 5 TABLET ORAL at 08:58

## 2025-07-10 RX ADMIN — GABAPENTIN 100 MG: 100 CAPSULE ORAL at 13:00

## 2025-07-10 RX ADMIN — B-COMPLEX W/ C & FOLIC ACID TAB 1 TABLET: TAB at 08:58

## 2025-07-10 RX ADMIN — GABAPENTIN 100 MG: 100 CAPSULE ORAL at 08:58

## 2025-07-10 NOTE — ASSESSMENT & PLAN NOTE
Home regimen- Nebivolol 5 mg daily, Lisinopril 20 mg BID, Verapamil 120 mg TID, and HCTZ 25 mg every other day  Cardiology input appreciated   Started on amlodipine 10 mg daily   Would avoid ACE-I, nebivolol and verapamil going forward.  Can follow up with PCP and/or cardiology outpatient if needed

## 2025-07-10 NOTE — PLAN OF CARE
Problem: PAIN - ADULT  Goal: Verbalizes/displays adequate comfort level or baseline comfort level  Description: Interventions:  - Encourage patient to monitor pain and request assistance  - Assess pain using appropriate pain scale  - Administer analgesics as ordered based on type and severity of pain and evaluate response  - Implement non-pharmacological measures as appropriate and evaluate response  - Consider cultural and social influences on pain and pain management  - Notify physician/advanced practitioner if interventions unsuccessful or patient reports new pain  - Educate patient/family on pain management process including their role and importance of  reporting pain   - Provide non-pharmacologic/complimentary pain relief interventions  Outcome: Progressing     Problem: INFECTION - ADULT  Goal: Absence of fever/infection during neutropenic period  Description: INTERVENTIONS:  - Monitor WBC  - Perform strict hand hygiene  - Limit to healthy visitors only  - No plants, dried, fresh or silk flowers with dillon in patient room  Outcome: Progressing     Problem: DISCHARGE PLANNING  Goal: Discharge to home or other facility with appropriate resources  Description: INTERVENTIONS:  - Identify barriers to discharge w/patient and caregiver  - Arrange for needed discharge resources and transportation as appropriate  - Identify discharge learning needs (meds, wound care, etc.)  - Refer to Case Management Department for coordinating discharge planning if the patient needs post-hospital services based on physician/advanced practitioner order or complex needs related to functional status, cognitive ability, or social support system  Outcome: Progressing

## 2025-07-10 NOTE — ASSESSMENT & PLAN NOTE
Lab Results   Component Value Date    HGBA1C 6.2 (H) 07/08/2025       Recent Labs     07/09/25  1112 07/09/25  1557 07/09/25  2040 07/10/25  0704   POCGLU 111 102 107 85       Blood Sugar Average: Last 72 hrs:  (P) [id number]  Not on any antihyperglycemic agents outpatient   Recommend to follow up with PCP for further glucose monitoring and medication initiation if indicated

## 2025-07-10 NOTE — NURSING NOTE
Pt discharged to home in stable condition via son. IV removed with catheter intact. AVS reviewed and sent with patient along with belongings. All questions answered.

## 2025-07-10 NOTE — ASSESSMENT & PLAN NOTE
Pt presented to the ED with c/o generalized weakness, body aches, fatigue, and dyspnea over the last few days. Per chart, pt stated that she had run out of her meds about a week ago but got a refill for them on 7/4 and has been taking them since.   Initial vitals in the ED were HR 52 and ; pt was A&O x3. While in the ED, pt became more bradycardic and blood pressures became softer. Pt was given a dose of Atropine 0.5 mg, however the bradycardia persisted and pt was to be started on an Epi gtt. Unfortunately, prior to the Epi gtt being started, pt had a bradycardic arrest. CPR was started and ROSC was obtained shortly after; pt received 1 amp of Epi. Pt was intubated during arrest. HR and BP noted to improve; pt was started on Epi gtt.   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 Pt with new DEMETRIUS, on multiple cardiac medications, shock state due to initial hypotension, and an elevated K level of 5.3  Patient was continued on Epi gtt; now off.   Patient awake and following commands shortly after arrest so TTM was not initiated  Received IVF  ECHO: EF 60%, mild AS  Troponins negative  Cardiology input appreciated-   Continue holding off on metoprolol, nebivolol and verapamil.  Unclear whether medications were part of precipitating the events.  HR has been 70-80s and asymptomatic  Follow up with PCP and/or cardiology outpatient

## 2025-07-10 NOTE — DISCHARGE INSTR - AVS FIRST PAGE
Dear Lindsay Crouch,     It was our pleasure to care for you here at [facility].  It is our hope that we were always able to exceed the expected standards for your care during your stay.  You were hospitalized due to bradycardic cardiac arrest.  You were cared for on the 2nd floor by ODIN Carmichael with the [facility] who covers for your primary care physician (PCP), Rissa Chino DO, while you were hospitalized.  If you have any questions or concerns related to this hospitalization, you may contact us at .  For follow up as well as any medication refills, we recommend that you follow up with your primary care physician.  A registered nurse will reach out to you by phone within a few days after your discharge to answer any additional questions that you may have after going home.  However, at this time we provide for you here, the most important instructions / recommendations at discharge:     Notable Medication Adjustments -   Start taking amlodipine 10 mg daily for blood pressure   Continue taking gabapentin 300 mg daily at  for now until seen by your PCP. We decreased the dose because your kidney function was elevated.   Stop taking metoprolol, nebivolol, hydrochlorothiazide and verapamil  Testing Required after Discharge -   None   Important follow up information -   Follow up with PCP and cardiology if needed   Other Instructions -   Please obtain your blood pressure and heart rate daily for the next 2 weeks and start a diary. Please bring this to your PCP visit.   Please review this entire after visit summary as additional general instructions including medication list, appointments, activity, diet, any pertinent wound care, and other additional recommendations from your care team that may be provided for you.      Sincerely,     ODIN Carmichael

## 2025-07-10 NOTE — ASSESSMENT & PLAN NOTE
Patient was intubated during cardiac arrest  Post VB.008/68.8/87.4/16.9  Extubated and now on RA

## 2025-07-10 NOTE — CASE MANAGEMENT
Case Management Discharge Planning Note    Patient name Lindsay Crouch  Location /-01 MRN [id number]  : 1945 Date 7/10/2025       Current Admission Date: 2025  Current Admission Diagnosis:Bradycardic cardiac arrest (HCC)   Patient Active Problem List    Diagnosis Date Noted    Arthritis 2025    Bradycardic cardiac arrest (HCC) 2025    Acute respiratory failure with hypercapnia (HCC) 2025    Nonrheumatic aortic valve stenosis 2025    Paresthesia of skin     Radiculopathy, lumbar region     DEMETRIUS (acute kidney injury) (HCC) 2021    PAC (premature atrial contraction) 2021    Chest pain 2021    History of cardiac arrest 2021    Primary hypertension 2021    Controlled type 2 diabetes mellitus, without long-term current use of insulin (HCC) 2021      LOS (days): 3  Geometric Mean LOS (GMLOS) (days): 4.9  Days to GMLOS:2.3     OBJECTIVE:  Risk of Unplanned Readmission Score: 11.82         Current admission status: Inpatient   Preferred Pharmacy:   [facility]  [address]  [address] [place] [address]  Phone: [phone] Fax: [phone]    Primary Care Provider: Rissa Chino DO    Primary Insurance: MEDICARE  Secondary Insurance:     DISCHARGE DETAILS:                                                             Transport at Discharge : Family                             IMM Given (Date):: 07/10/25  IMM Given to:: Patient  Patient provided IMM and agreeable to be discharged today     Additional Comments: Cm met with patient at bedside.  Medically clear for discharge home today.  No CM needs.

## 2025-07-10 NOTE — PLAN OF CARE
Problem: DISCHARGE PLANNING  Goal: Discharge to home or other facility with appropriate resources  Description: INTERVENTIONS:  - Identify barriers to discharge w/patient and caregiver  - Arrange for needed discharge resources and transportation as appropriate  - Identify discharge learning needs (meds, wound care, etc.)  - Arrange for interpretive services to assist at discharge as needed  - Refer to Case Management Department for coordinating discharge planning if the patient needs post-hospital services based on physician/advanced practitioner order or complex needs related to functional status, cognitive ability, or social support system  Outcome: Progressing     Problem: PAIN - ADULT  Goal: Verbalizes/displays adequate comfort level or baseline comfort level  Description: Interventions:  - Encourage patient to monitor pain and request assistance  - Assess pain using appropriate pain scale  - Administer analgesics as ordered based on type and severity of pain and evaluate response  - Implement non-pharmacological measures as appropriate and evaluate response  - Consider cultural and social influences on pain and pain management  - Notify physician/advanced practitioner if interventions unsuccessful or patient reports new pain  - Educate patient/family on pain management process including their role and importance of  reporting pain   - Provide non-pharmacologic/complimentary pain relief interventions  Outcome: Progressing     Problem: SAFETY ADULT  Goal: Patient will remain free of falls  Description: INTERVENTIONS:  - Educate patient/family on patient safety including physical limitations  - Instruct patient to call for assistance with activity   - Consider consulting OT/PT to assist with strengthening/mobility based on AM PAC & JH-HLM score  - Consult OT/PT to assist with strengthening/mobility   - Keep Call bell within reach  - Keep bed low and locked with side rails adjusted as appropriate  - Keep care  items and personal belongings within reach  - Initiate and maintain comfort rounds  - Make Fall Risk Sign visible to staff  - Offer Toileting every 2 Hours, in advance of need  - Initiate/Maintain bed alarm  - Obtain necessary fall risk management equipment  - Apply yellow socks and bracelet for high fall risk patients  - Consider moving patient to room near nurses station  Outcome: Progressing

## 2025-07-10 NOTE — DISCHARGE SUMMARY
"Discharge Summary - Hospitalist   Name: Lindsay Crouch 79 y.o. female I MRN: [id number]  Unit/Bed#: -[facility] I Date of Admission: 7/7/2025   Date of Service: 7/10/2025 I Hospital Day: 3     Assessment & Plan  Bradycardic cardiac arrest (HCC)  Pt presented to the ED with c/o generalized weakness, body aches, fatigue, and dyspnea over the last few days. Per chart, pt stated that she had run out of her meds about a week ago but got a refill for them on 7/4 and has been taking them since.   Initial vitals in the ED were HR 52 and ; pt was A&O x3. While in the ED, pt became more bradycardic and blood pressures became softer. Pt was given a dose of Atropine 0.5 mg, however the bradycardia persisted and pt was to be started on an Epi gtt. Unfortunately, prior to the Epi gtt being started, pt had a bradycardic arrest. CPR was started and ROSC was obtained shortly after; pt received 1 amp of Epi. Pt was intubated during arrest. HR and BP noted to improve; pt was started on Epi gtt.   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 Pt with new DEMETRIUS, on multiple cardiac medications, shock state due to initial hypotension, and an elevated K level of 5.3  Patient was continued on Epi gtt; now off.   Patient awake and following commands shortly after arrest so TTM was not initiated  Received IVF  ECHO: EF 60%, mild AS  Troponins negative  Cardiology input appreciated-   Continue holding off on metoprolol, nebivolol and verapamil.  Unclear whether medications were part of precipitating the events.  HR has been 70-80s and asymptomatic  Follow up with PCP and/or cardiology outpatient   History of cardiac arrest  Per pt's chart, pt stated that she previously had a \"heart arrest\" > 40 years ago following surgery for a hysterectomy. There is no noted documentation of the arrest noted.   Denies any stents or heart sx  Stress test completed in 2018 was negative for ischemia. Repeat stress test completed in 2021 was also negative.  Primary " hypertension  Home regimen- Nebivolol 5 mg daily, Lisinopril 20 mg BID, Verapamil 120 mg TID, and HCTZ 25 mg every other day  Cardiology input appreciated   Started on amlodipine 10 mg daily   Would avoid ACE-I, nebivolol and verapamil going forward.  Can follow up with PCP and/or cardiology outpatient if needed   Controlled type 2 diabetes mellitus, without long-term current use of insulin (HCC)  Lab Results   Component Value Date    HGBA1C 6.2 (H) 2025       Recent Labs     25  1112 25  1557 25  2040 07/10/25  0704   POCGLU 111 102 107 85       Blood Sugar Average: Last 72 hrs:  (P) [id number]  Not on any antihyperglycemic agents outpatient   Recommend to follow up with PCP for further glucose monitoring and medication initiation if indicated   DEMETRIUS (acute kidney injury) (HCC)  Baseline Cr appears to be 1.0-1.2 mg/dL; noted to be 2.19 on admission  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  DEMETRIUS resolved   Resolved with IVF   Continue to follow up with BMP outpatient   Acute respiratory failure with hypercapnia (HCC)  Patient was intubated during cardiac arrest  Post VB.008/68.8/87.4/16.9  Extubated and now on RA     Discharging Physician / Practitioner: ODIN Carmichael  PCP: Rissa Chino DO  Admission Date:   Admission Orders (From admission, onward)       Ordered        25 0039  INPATIENT ADMISSION  Once            25 2311  INPATIENT ADMISSION  Once,   Status:  Canceled                          Discharge Date: 07/10/25    Reason for Admission: bradycardiac cardiac arrest     Discharge Diagnoses:     Principal Problem:    Bradycardic cardiac arrest (HCC)  Active Problems:    History of cardiac arrest    Primary hypertension    Controlled type 2 diabetes mellitus, without long-term current use of insulin (HCC)    DEMETRIUS (acute kidney injury) (HCC)    Acute respiratory failure with hypercapnia (HCC)    Nonrheumatic aortic valve stenosis  Resolved Problems:     Hyperkalemia      Consultations During Hospital Stay:  IP CONSULT TO CASE MANAGEMENT  IP CONSULT TO CARDIOLOGY    Procedures Performed:     None     Significant Findings / Test Results:     CXR in AM  Result Date: 7/9/2025  The tip of the endotracheal tube is still directed into the orifice of the right mainstem bronchus. This was communicated by secure text message to the patient's care team and I was informed that the patient has already been extubated. Slightly improved aeration of the left lower lung field with significant residual atelectasis still noted. There appears to be mild pulmonary edema present at this time. Endogastric tube extends to the abdomen Suggest continued follow-up to ensure clearing of the lungs and reaeration at the left base     XR chest 1 view portable  Result Date: 7/9/2025  ET tube in right main bronchus, subsequently retracted New left base opacity with loss of the diaphragm, likely atelectasis. Pneumonia not excluded in the appropriate clinical setting. Mild pulmonary venous congestion.     XR chest portable  Result Date: 7/9/2025  No acute cardiopulmonary disease.      Echo complete w/ contrast if indicated  Result Date: 7/8/2025    Left Ventricle: Left ventricular cavity size is normal. Wall thickness is mildly increased. There is mild concentric hypertrophy. The left ventricular ejection fraction is 60%. Systolic function is normal. Wall motion is normal. Diastolic function is normal.   Right Ventricle: Right ventricular cavity size is mildly dilated.The right ventricular systolic pressure is normal.   Aortic Valve: The aortic valve is trileaflet. There is mildly reduced mobility. There is no evidence of regurgitation. There is mild stenosis. The aortic valve peak velocity is 2.3 m/s. The aortic valve mean gradient is 8 mmHg.   Biatrial enlargement.     Incidental Findings:   None      Test Results Pending at Discharge (will require follow up):   None      Outpatient Tests  Requested:  Follow up with PCP, can follow up with cardiology as needed     Complications:  none     Hospital Course:     Lindsay Crouch is a 79 y.o. female patient who originally presented to the hospital on 7/7/2025 due to bradycardic arrest.  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  The patient presented to the ED with a 24-hour period of worsening generalized weakness and not feeling herself.  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  She was intubated at the time of the rest but fortunately was able to be extubated later on that day as her mental status remained intact.  Patient required epinephrine infusion but able to wean off very quickly.  She also received fluid resuscitation and treatment for hyperkalemia.  The patient was evaluated by cardiology and her medication regimen was adjusted.  At this time cardiology recommends to hold off on verapamil, beta-blocker, lisinopril and hydrochlorothiazide for now.  She was started on amlodipine for blood pressure control.  Overall the patient clinically improved.  She was downgraded to  IM service on 7/9.  The patient remained stable with heart rate ranging between 70 to 80s and she is medically stable for discharge today. Discussed with cardiology, the patient can follow-up with cardiology as needed and she is stable from cardiac standpoint for discharge.  Prior to discharge, all questions were answered and patient expressed understanding of the plan. This serves as a brief discharge summary. See prior documentation for full details.     Condition at Discharge: good     Discharge Day Visit / Exam:     Subjective: The patient was seen and examined.  She stated she is feeling well.  She denies any chest pain, dyspnea, nausea, vomiting, or abdominal pain.  She is looking forward  "to going home.  Vitals: Blood Pressure: 170/78 (07/10/25 0704)  Pulse: 70 (07/10/25 0704)  Temperature: 97.8 °F (36.6 °C) (07/10/25 0704)  Temp Source: Temporal (07/09/25 2000)  Respirations: 18 (07/10/25 0704)  Height: 5' 4\" (162.6 cm) (07/08/25 0730)  Weight - Scale: 80.2 kg (176 lb 12.9 oz) (07/10/25 0600)  SpO2: 93 % (07/10/25 0704)  Exam:   Physical Exam  Vitals and nursing note reviewed.   Constitutional:       General: She is not in acute distress.     Appearance: Normal appearance.   HENT:      Head: Normocephalic and atraumatic.      Right Ear: External ear normal.      Left Ear: External ear normal.      Nose: Nose normal. No rhinorrhea.      Mouth/Throat:      Mouth: Mucous membranes are moist.      Pharynx: Oropharynx is clear.     Eyes:      General:         Right eye: No discharge.         Left eye: No discharge.      Pupils: Pupils are equal, round, and reactive to light.       Cardiovascular:      Rate and Rhythm: Normal rate and regular rhythm.      Pulses: Normal pulses.      Heart sounds: Normal heart sounds. No murmur heard.  Pulmonary:      Effort: Pulmonary effort is normal. No respiratory distress.      Breath sounds: Normal breath sounds.   Abdominal:      General: Bowel sounds are normal. There is no distension.      Palpations: Abdomen is soft. There is no mass.      Tenderness: There is no abdominal tenderness.     Musculoskeletal:         General: No swelling or tenderness. Normal range of motion.      Cervical back: Normal range of motion and neck supple. No muscular tenderness.     Skin:     General: Skin is warm and dry.      Capillary Refill: Capillary refill takes less than 2 seconds.      Findings: No erythema or rash.     Neurological:      General: No focal deficit present.      Mental Status: She is alert and oriented to person, place, and time. Mental status is at baseline.     Psychiatric:         Mood and Affect: Mood normal.         Behavior: Behavior normal.         Thought " Content: Thought content normal.         Judgment: Judgment normal.       Discharge instructions/Information to patient and family:   See after visit summary for information provided to patient and family.      Provisions for Follow-Up Care:  See after visit summary for information related to follow-up care and any pertinent home health orders.      Disposition:     Home    Planned Readmission: no      Discharge Statement:  I spent 42 minutes discharging the patient. This time was spent on the day of discharge. I had direct contact with the patient on the day of discharge. Greater than 50% of the total time was spent examining patient, answering all patient questions, arranging and discussing plan of care with patient as well as directly providing post-discharge instructions.  Additional time then spent on discharge activities.    Discharge Medications:  See after visit summary for reconciled discharge medications provided to patient and family.      ** Please Note: This note has been constructed using a voice recognition system **